# Patient Record
Sex: MALE | Race: WHITE | Employment: OTHER | URBAN - METROPOLITAN AREA
[De-identification: names, ages, dates, MRNs, and addresses within clinical notes are randomized per-mention and may not be internally consistent; named-entity substitution may affect disease eponyms.]

---

## 2022-03-24 ENCOUNTER — APPOINTMENT (OUTPATIENT)
Dept: CT IMAGING | Age: 87
DRG: 291 | End: 2022-03-24
Attending: HOSPITALIST
Payer: MEDICARE

## 2022-03-24 ENCOUNTER — APPOINTMENT (OUTPATIENT)
Dept: VASCULAR SURGERY | Age: 87
DRG: 291 | End: 2022-03-24
Attending: HOSPITALIST
Payer: MEDICARE

## 2022-03-24 ENCOUNTER — HOSPITAL ENCOUNTER (INPATIENT)
Age: 87
LOS: 5 days | Discharge: HOSPICE | DRG: 291 | End: 2022-03-29
Attending: EMERGENCY MEDICINE | Admitting: FAMILY MEDICINE
Payer: MEDICARE

## 2022-03-24 ENCOUNTER — APPOINTMENT (OUTPATIENT)
Dept: CT IMAGING | Age: 87
DRG: 291 | End: 2022-03-24
Attending: EMERGENCY MEDICINE
Payer: MEDICARE

## 2022-03-24 ENCOUNTER — APPOINTMENT (OUTPATIENT)
Dept: GENERAL RADIOLOGY | Age: 87
DRG: 291 | End: 2022-03-24
Attending: EMERGENCY MEDICINE
Payer: MEDICARE

## 2022-03-24 DIAGNOSIS — R09.02 HYPOXIA: ICD-10-CM

## 2022-03-24 DIAGNOSIS — I48.91 ATRIAL FIBRILLATION WITH RAPID VENTRICULAR RESPONSE (HCC): Primary | ICD-10-CM

## 2022-03-24 DIAGNOSIS — N28.9 ACUTE RENAL INSUFFICIENCY: ICD-10-CM

## 2022-03-24 DIAGNOSIS — R06.02 SHORTNESS OF BREATH: ICD-10-CM

## 2022-03-24 DIAGNOSIS — F01.50 VASCULAR DEMENTIA WITHOUT BEHAVIORAL DISTURBANCE (HCC): ICD-10-CM

## 2022-03-24 DIAGNOSIS — Z51.5 PALLIATIVE CARE BY SPECIALIST: ICD-10-CM

## 2022-03-24 DIAGNOSIS — R45.1 AGITATION: ICD-10-CM

## 2022-03-24 DIAGNOSIS — S01.01XA LACERATION OF SCALP, INITIAL ENCOUNTER: ICD-10-CM

## 2022-03-24 DIAGNOSIS — L03.119 CELLULITIS OF LOWER EXTREMITY, UNSPECIFIED LATERALITY: ICD-10-CM

## 2022-03-24 LAB
ALBUMIN SERPL-MCNC: 3.2 G/DL (ref 3.5–5)
ALBUMIN/GLOB SERPL: 0.9 {RATIO} (ref 1.1–2.2)
ALP SERPL-CCNC: 70 U/L (ref 45–117)
ALT SERPL-CCNC: 15 U/L (ref 12–78)
ANION GAP SERPL CALC-SCNC: 10 MMOL/L (ref 5–15)
AST SERPL-CCNC: 27 U/L (ref 15–37)
BASOPHILS # BLD: 0 K/UL (ref 0–0.1)
BASOPHILS NFR BLD: 0 % (ref 0–1)
BILIRUB SERPL-MCNC: 1.9 MG/DL (ref 0.2–1)
BNP SERPL-MCNC: ABNORMAL PG/ML (ref 0–450)
BUN SERPL-MCNC: 42 MG/DL (ref 6–20)
BUN/CREAT SERPL: 23 (ref 12–20)
CALCIUM SERPL-MCNC: 8.8 MG/DL (ref 8.5–10.1)
CHLORIDE SERPL-SCNC: 100 MMOL/L (ref 97–108)
CO2 SERPL-SCNC: 29 MMOL/L (ref 21–32)
CREAT SERPL-MCNC: 1.84 MG/DL (ref 0.7–1.3)
DIFFERENTIAL METHOD BLD: ABNORMAL
EOSINOPHIL # BLD: 0 K/UL (ref 0–0.4)
EOSINOPHIL NFR BLD: 0 % (ref 0–7)
ERYTHROCYTE [DISTWIDTH] IN BLOOD BY AUTOMATED COUNT: 15.3 % (ref 11.5–14.5)
GLOBULIN SER CALC-MCNC: 3.7 G/DL (ref 2–4)
GLUCOSE SERPL-MCNC: 110 MG/DL (ref 65–100)
HCT VFR BLD AUTO: 38 % (ref 36.6–50.3)
HGB BLD-MCNC: 12 G/DL (ref 12.1–17)
IMM GRANULOCYTES # BLD AUTO: 0.1 K/UL (ref 0–0.04)
IMM GRANULOCYTES NFR BLD AUTO: 1 % (ref 0–0.5)
LACTATE SERPL-SCNC: 2.1 MMOL/L (ref 0.4–2)
LYMPHOCYTES # BLD: 0.7 K/UL (ref 0.8–3.5)
LYMPHOCYTES NFR BLD: 5 % (ref 12–49)
MCH RBC QN AUTO: 30.2 PG (ref 26–34)
MCHC RBC AUTO-ENTMCNC: 31.6 G/DL (ref 30–36.5)
MCV RBC AUTO: 95.5 FL (ref 80–99)
MONOCYTES # BLD: 0.6 K/UL (ref 0–1)
MONOCYTES NFR BLD: 4 % (ref 5–13)
NEUTS SEG # BLD: 13 K/UL (ref 1.8–8)
NEUTS SEG NFR BLD: 90 % (ref 32–75)
NRBC # BLD: 0 K/UL (ref 0–0.01)
NRBC BLD-RTO: 0 PER 100 WBC
PLATELET # BLD AUTO: 269 K/UL (ref 150–400)
PMV BLD AUTO: 10.6 FL (ref 8.9–12.9)
POTASSIUM SERPL-SCNC: 3.9 MMOL/L (ref 3.5–5.1)
PROT SERPL-MCNC: 6.9 G/DL (ref 6.4–8.2)
RBC # BLD AUTO: 3.98 M/UL (ref 4.1–5.7)
SODIUM SERPL-SCNC: 139 MMOL/L (ref 136–145)
TROPONIN-HIGH SENSITIVITY: 25 NG/L (ref 0–76)
WBC # BLD AUTO: 14.4 K/UL (ref 4.1–11.1)

## 2022-03-24 PROCEDURE — 74011000258 HC RX REV CODE- 258: Performed by: EMERGENCY MEDICINE

## 2022-03-24 PROCEDURE — 85025 COMPLETE CBC W/AUTO DIFF WBC: CPT

## 2022-03-24 PROCEDURE — 75810000293 HC SIMP/SUPERF WND  RPR

## 2022-03-24 PROCEDURE — 74011000250 HC RX REV CODE- 250: Performed by: EMERGENCY MEDICINE

## 2022-03-24 PROCEDURE — 83605 ASSAY OF LACTIC ACID: CPT

## 2022-03-24 PROCEDURE — 99285 EMERGENCY DEPT VISIT HI MDM: CPT

## 2022-03-24 PROCEDURE — 74011000250 HC RX REV CODE- 250: Performed by: HOSPITALIST

## 2022-03-24 PROCEDURE — 70450 CT HEAD/BRAIN W/O DYE: CPT

## 2022-03-24 PROCEDURE — 74011250636 HC RX REV CODE- 250/636: Performed by: HOSPITALIST

## 2022-03-24 PROCEDURE — 65660000001 HC RM ICU INTERMED STEPDOWN

## 2022-03-24 PROCEDURE — 83880 ASSAY OF NATRIURETIC PEPTIDE: CPT

## 2022-03-24 PROCEDURE — 87040 BLOOD CULTURE FOR BACTERIA: CPT

## 2022-03-24 PROCEDURE — 77030019905 HC CATH URETH INTMIT MDII -A

## 2022-03-24 PROCEDURE — 93005 ELECTROCARDIOGRAM TRACING: CPT

## 2022-03-24 PROCEDURE — 36415 COLL VENOUS BLD VENIPUNCTURE: CPT

## 2022-03-24 PROCEDURE — 84484 ASSAY OF TROPONIN QUANT: CPT

## 2022-03-24 PROCEDURE — 71045 X-RAY EXAM CHEST 1 VIEW: CPT

## 2022-03-24 PROCEDURE — 74011250636 HC RX REV CODE- 250/636: Performed by: EMERGENCY MEDICINE

## 2022-03-24 PROCEDURE — 94640 AIRWAY INHALATION TREATMENT: CPT

## 2022-03-24 PROCEDURE — 90715 TDAP VACCINE 7 YRS/> IM: CPT | Performed by: EMERGENCY MEDICINE

## 2022-03-24 PROCEDURE — 80053 COMPREHEN METABOLIC PANEL: CPT

## 2022-03-24 PROCEDURE — 72125 CT NECK SPINE W/O DYE: CPT

## 2022-03-24 PROCEDURE — 93970 EXTREMITY STUDY: CPT

## 2022-03-24 PROCEDURE — 90471 IMMUNIZATION ADMIN: CPT

## 2022-03-24 PROCEDURE — 96374 THER/PROPH/DIAG INJ IV PUSH: CPT

## 2022-03-24 RX ORDER — SODIUM CHLORIDE 0.9 % (FLUSH) 0.9 %
5-40 SYRINGE (ML) INJECTION EVERY 8 HOURS
Status: DISCONTINUED | OUTPATIENT
Start: 2022-03-24 | End: 2022-03-29 | Stop reason: HOSPADM

## 2022-03-24 RX ORDER — IPRATROPIUM BROMIDE AND ALBUTEROL SULFATE 2.5; .5 MG/3ML; MG/3ML
3 SOLUTION RESPIRATORY (INHALATION)
Status: COMPLETED | OUTPATIENT
Start: 2022-03-24 | End: 2022-03-24

## 2022-03-24 RX ORDER — TRIAMCINOLONE ACETONIDE 1 MG/G
CREAM TOPICAL 2 TIMES DAILY
COMMUNITY

## 2022-03-24 RX ORDER — LATANOPROST 50 UG/ML
1 SOLUTION/ DROPS OPHTHALMIC
COMMUNITY

## 2022-03-24 RX ORDER — FUROSEMIDE 10 MG/ML
40 INJECTION INTRAMUSCULAR; INTRAVENOUS DAILY
Status: DISCONTINUED | OUTPATIENT
Start: 2022-03-24 | End: 2022-03-24

## 2022-03-24 RX ORDER — ASPIRIN 81 MG/1
81 TABLET ORAL DAILY
COMMUNITY

## 2022-03-24 RX ORDER — ACETAMINOPHEN 325 MG/1
650 TABLET ORAL
Status: DISCONTINUED | OUTPATIENT
Start: 2022-03-24 | End: 2022-03-29 | Stop reason: HOSPADM

## 2022-03-24 RX ORDER — ATORVASTATIN CALCIUM 20 MG/1
20 TABLET, FILM COATED ORAL DAILY
COMMUNITY

## 2022-03-24 RX ORDER — DICLOFENAC SODIUM 1 MG/ML
1 SOLUTION/ DROPS OPHTHALMIC 4 TIMES DAILY
COMMUNITY

## 2022-03-24 RX ORDER — ENOXAPARIN SODIUM 100 MG/ML
30 INJECTION SUBCUTANEOUS DAILY
Status: DISCONTINUED | OUTPATIENT
Start: 2022-03-25 | End: 2022-03-28

## 2022-03-24 RX ORDER — FUROSEMIDE 10 MG/ML
40 INJECTION INTRAMUSCULAR; INTRAVENOUS 2 TIMES DAILY
Status: DISCONTINUED | OUTPATIENT
Start: 2022-03-24 | End: 2022-03-29 | Stop reason: HOSPADM

## 2022-03-24 RX ORDER — SODIUM CHLORIDE 0.9 % (FLUSH) 0.9 %
5-40 SYRINGE (ML) INJECTION AS NEEDED
Status: DISCONTINUED | OUTPATIENT
Start: 2022-03-24 | End: 2022-03-29 | Stop reason: HOSPADM

## 2022-03-24 RX ORDER — ACETAMINOPHEN 650 MG/1
650 SUPPOSITORY RECTAL
Status: DISCONTINUED | OUTPATIENT
Start: 2022-03-24 | End: 2022-03-29 | Stop reason: HOSPADM

## 2022-03-24 RX ORDER — ONDANSETRON 4 MG/1
4 TABLET, ORALLY DISINTEGRATING ORAL
Status: DISCONTINUED | OUTPATIENT
Start: 2022-03-24 | End: 2022-03-29 | Stop reason: HOSPADM

## 2022-03-24 RX ORDER — BUMETANIDE 2 MG/1
2 TABLET ORAL 2 TIMES DAILY
COMMUNITY
End: 2022-03-29

## 2022-03-24 RX ORDER — HALOPERIDOL 5 MG/ML
1 INJECTION INTRAMUSCULAR
Status: DISCONTINUED | OUTPATIENT
Start: 2022-03-24 | End: 2022-03-28

## 2022-03-24 RX ORDER — CARVEDILOL 6.25 MG/1
6.25 TABLET ORAL 2 TIMES DAILY WITH MEALS
COMMUNITY
End: 2022-03-29

## 2022-03-24 RX ORDER — ONDANSETRON 2 MG/ML
4 INJECTION INTRAMUSCULAR; INTRAVENOUS
Status: DISCONTINUED | OUTPATIENT
Start: 2022-03-24 | End: 2022-03-29 | Stop reason: HOSPADM

## 2022-03-24 RX ORDER — POLYETHYLENE GLYCOL 3350 17 G/17G
17 POWDER, FOR SOLUTION ORAL DAILY PRN
Status: DISCONTINUED | OUTPATIENT
Start: 2022-03-24 | End: 2022-03-29 | Stop reason: HOSPADM

## 2022-03-24 RX ADMIN — WATER 1 G: 1 INJECTION INTRAMUSCULAR; INTRAVENOUS; SUBCUTANEOUS at 14:34

## 2022-03-24 RX ADMIN — SODIUM CHLORIDE 500 ML: 9 INJECTION, SOLUTION INTRAVENOUS at 08:39

## 2022-03-24 RX ADMIN — SODIUM CHLORIDE 500 ML: 9 INJECTION, SOLUTION INTRAVENOUS at 08:40

## 2022-03-24 RX ADMIN — IPRATROPIUM BROMIDE AND ALBUTEROL SULFATE 3 ML: .5; 3 SOLUTION RESPIRATORY (INHALATION) at 08:29

## 2022-03-24 RX ADMIN — Medication 5 ML: at 08:28

## 2022-03-24 RX ADMIN — SODIUM CHLORIDE, PRESERVATIVE FREE 10 ML: 5 INJECTION INTRAVENOUS at 21:29

## 2022-03-24 RX ADMIN — HALOPERIDOL LACTATE 1 MG: 5 INJECTION, SOLUTION INTRAMUSCULAR at 19:26

## 2022-03-24 RX ADMIN — DILTIAZEM HYDROCHLORIDE 2.5 MG/HR: 5 INJECTION, SOLUTION INTRAVENOUS at 08:58

## 2022-03-24 RX ADMIN — TETANUS TOXOID, REDUCED DIPHTHERIA TOXOID AND ACELLULAR PERTUSSIS VACCINE, ADSORBED 0.5 ML: 5; 2.5; 8; 8; 2.5 SUSPENSION INTRAMUSCULAR at 08:28

## 2022-03-24 RX ADMIN — CEFTRIAXONE SODIUM 1 G: 1 INJECTION, POWDER, FOR SOLUTION INTRAMUSCULAR; INTRAVENOUS at 09:53

## 2022-03-24 RX ADMIN — FUROSEMIDE 40 MG: 10 INJECTION, SOLUTION INTRAMUSCULAR; INTRAVENOUS at 19:09

## 2022-03-24 RX ADMIN — SODIUM CHLORIDE, PRESERVATIVE FREE 10 ML: 5 INJECTION INTRAVENOUS at 14:35

## 2022-03-24 NOTE — PROGRESS NOTES
1021: TRANSFER - IN REPORT:    Verbal report received from Carri Kaufman (name) on Tanya Roach  being received from [facility] (unit) for urgent transfer      Report consisted of patients Situation, Background, Assessment and   Recommendations(SBAR). Information from the following report(s) SBAR, Kardex, ED Summary, [facility] and Recent Results was reviewed with the receiving nurse. Opportunity for questions and clarification was provided. Assessment completed upon patients arrival to unit and care assumed. 1930: Bedside shift change report given to vL Perez (oncoming nurse) by Bijal Chen (offgoing nurse). Report included the following information SBAR, Kardex, ED Summary, Intake/Output, MAR and Recent Results. Student was appropriately supervised during medication administration by preceptor. Tod Olson RN     Charting and patient care of Tanya Roach by Aiyana Antonio from 1200 to 9740 was supervised and reviewed by this RN.

## 2022-03-24 NOTE — H&P
[address] [facility] Adult  Hospitalist Group  History and Physical    Date of Service:  3/24/2022  Primary Care Provider: Basil Vines MD  Source of information: The patient, Chart review and Spouse/family member    Chief Complaint: Fall      History of Presenting Illness:   Emory Buckner is a 80 y.o. male who presents with fall and tachycardia  Pt lives at [facility], pt is very hard of hearing. History mainly from his daughter. Mr. Jyothi Bethea is a 79yo male who presents to the short pumpER with complaints of a fall. Per EMS, the patient was found on the ground. It is unclear how he fell. He was noted to be hypoxic with an oxygen saturation in the 70s, per EMS. They put him on nasal cannula and his sats improved. No other complaints reported. Noticed rapid afib in ER. So cardizem drip started. Per daughter he can walk with a walker. 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 He has chronic leg swelling, but recently his leg swelling is getting worse, and also noticed increasing redness and complaining pain. No fever notced        REVIEW OF SYSTEMS:  Limited due to difficulty communicate     Past Medical History:   Diagnosis Date    Arthritis     OA (shoulders)    Cancer ([address] 2002    prostate    Fracture 2004    leg (w/ankle bracing)    Glaucoma     Hearing loss     Polymyalgia rheumatica (HCC)     Radiation proctitis     Spinal stenosis       Past Surgical History:   Procedure Laterality Date    ENDOSCOPY[facility]ON[facility] DIAG[facility]STIC  7/2005    [facility])    HX APPENDECTOMY  1995    HX HEENT  5/2005    cat, ext. (right)-(Goldmann)    HX HERNIA REPAIR  6/2004    Right     Prior to Admission medications    Not on File     Allergies   Allergen Reactions    Digoxin Unknown (comments)      No family history on file. Social History:  reports that he has never smoked.  He has never used smokeless tobacco.     Family and social history were personally reviewed, all pertinent and relevant details are outlined as above. Objective:     Visit Vitals  /69 (BP Patient Position: At rest)   Pulse 92   Temp 97.7 °F (36.5 °C)   Resp 16   Ht 5' 9\" (1.753 m)   Wt 94.5 kg (208 lb 5.4 oz)   SpO2 95%   BMI 30.77 kg/m²    O2 Flow Rate (L/min): 2 l/min O2 Device: Nasal cannula    PHYSICAL EXAM:   General: awake, following commands, but hard of hearing, right eye blind   HEENT: right eye blind   Neck: Supple, no JVD, no meningeal signs  Chest: basilar crackles, no wheezing   CVS: irregular, mild tachy S1 S2 heard, no murmurs/rubs/gallops  Abd: Soft, non-tender, non-distended, +bowel sounds   Ext: bilat swelling, red, some open skins and small wounds. Neuro/Psych:  UPPER EXT 5-/5, LOWER EXT 3/5 BILAT. Cn INTACT   Cap refill: Brisk, less than 3 seconds  Pulses: 2+, symmetric in all extremities  Skin: rash noticed back and neck, skin changes in both lower leg     Data Review: All diagnostic labs and studies have been reviewed. Abnormal Labs Reviewed   LACTIC ACID - Abnormal; Notable for the following components:       Result Value    Lactic acid 2.1 (*)     All other components within normal limits   CBC WITH AUTOMATED DIFF - Abnormal; Notable for the following components:    WBC 14.4 (*)     RBC 3.98 (*)     HGB 12.0 (*)     RDW 15.3 (*)     NEUTROPHILS 90 (*)     LYMPHOCYTES 5 (*)     MONOCYTES 4 (*)     IMMATURE GRANULOCYTES 1 (*)     ABS. NEUTROPHILS 13.0 (*)     ABS. LYMPHOCYTES 0.7 (*)     ABS. IMM.  GRANS. 0.1 (*)     All other components within normal limits   METABOLIC PANEL, COMPREHENSIVE - Abnormal; Notable for the following components:    Glucose 110 (*)     BUN 42 (*)     Creatinine 1.84 (*)     BUN/Creatinine ratio 23 (*)     GFR est AA 41 (*)     GFR est non-AA 34 (*)     Bilirubin, total 1.9 (*)     Albumin 3.2 (*)     A-G Ratio 0.9 (*)     All other components within normal limits   NT-PRO BNP - Abnormal; Notable for the following components:    NT pro-BNP 17,855 (*)     All other components within normal limits       All Micro Results     Procedure Component Value Units Date/Time    CULTURE, URINE [[id number]]     Order Status: Sent Specimen: Urine from Clean catch     CULTURE, BLOOD, PAIRED [[id number]] Collected: 03/24/22 0836    Order Status: Completed Specimen: Blood Updated: 03/24/22 0859          IMAGING:   XR CHEST PORT   Final Result   Small right pleural effusion with underlying atelectasis. CT HEAD WO CONT   Final Result   Old left occipital infarct. Generalized atrophy. No acute abnormality. ECG/ECHO:    Results for orders placed or performed during the hospital encounter of 03/24/22   EKG, 12 LEAD, INITIAL   Result Value Ref Range    Ventricular Rate 124 BPM    Atrial Rate 125 BPM    QRS Duration 82 ms    Q-T Interval 348 ms    QTC Calculation (Bezet) 499 ms    Calculated R Axis 36 degrees    Calculated T Axis -30 degrees    Diagnosis       Atrial fibrillation with rapid ventricular response  ST & T wave abnormality, consider lateral ischemia  Abnormal ECG  No previous ECGs available          Assessment:   Given the patient's current clinical presentation, there is a high level of concern for decompensation if discharged from the emergency department. Complex decision making was performed, which includes reviewing the patient's available past medical records, laboratory results, and imaging studies. Active Problems:    Hypoxemia (3/24/2022)      A-fib ([address] (3/24/2022)      Plan:     1. Rapid afib: not a AC candidate due to fall and history of GIB. Continue asa, cardizem drip, echo. Cardiologist. Consult. 2. Hypoxia: may due to chf, acute on chronic diastolic chf. Was taking bumex 2mg bid. Will continue lasix 40mg iv bid, will assess output, and cr. Echo pending. 3. Bilateral leg cellulitis: iv ancef (dose adjusted), wound care   4. 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  Fall: PT/OT         DIET: No diet orders on file ISOLATION PRECAUTIONS: There are currently no Active Isolations  CODE STATUS: No Order   DVT PROPHYLAXIS: Lovenox  FUNCTIONAL STATUS PRIOR TO HOSPITALIZATION: Capable of only limited self-care; confined to bed or chair likely more than 50% of waking hours. EARLY MOBILITY ASSESSMENT: Recommend an assessment from physical therapy and/or occupational therapy  ANTICIPATED DISCHARGE: Greater than 48 hours. EMERGENCY CONTACT/SURROGATE DECISION MAKER: daughter, called and updated     CRITICAL CARE WAS PERFORMED FOR THIS ENCOUNTER: NO.      Signed By: Grady Anand MD     March 24, 2022         Please note that this dictation may have been completed with Dragon, the Telik voice recognition software. Quite often unanticipated grammatical, syntax, homophones, and other interpretive errors are inadvertently transcribed by the computer software. Please disregard these errors. Please excuse any errors that have escaped final proofreading.

## 2022-03-24 NOTE — PROGRESS NOTES
10:21: TRANSFER - IN REPORT:    Verbal report received from Sugey NAVARRO(name) on Beronica Wellss  being received from [facility](unit) for routine progression of care      Report consisted of patients Situation, Background, Assessment and   Recommendations(SBAR). Information from the following report(s) SBAR, Kardex, ED Summary, MAR, Recent Results and Cardiac Rhythm A fib was reviewed with the receiving nurse. Opportunity for questions and clarification was provided. Assessment completed upon patients arrival to unit and care assumed. 1730: traveled down to CT with pt. Off the floor. 1800: Pt is back on the floor.

## 2022-03-24 NOTE — ED NOTES
TRANSFER - OUT REPORT:    Verbal report given to lakisha SCOTT (name) on [place]  being transferred to [facility] (unit) for routine progression of care       Report consisted of patients Situation, Background, Assessment and   Recommendations(SBAR). Information from the following report(s) SBAR and ED Summary was reviewed with the receiving nurse. Lines:   Peripheral IV 03/24/22 Right Hand (Active)       Peripheral IV 03/24/22 Left Antecubital (Active)   Site Assessment Clean, dry, & intact 03/24/22 0847   Phlebitis Assessment 0 03/24/22 0847   Dressing Status Clean, dry, & intact 03/24/22 0847   Hub Color/Line Status Flushed 03/24/22 0847   Action Taken Blood drawn 03/24/22 0847        Opportunity for questions and clarification was provided.       Patient transported with:   Monitor

## 2022-03-24 NOTE — PROGRESS NOTES
Occupational Therapy:    Chart reviewed and attempted to see for OT session, however per daughter, patient just began sleeping after agitation and confusion prior to arrival, asking to hold therapy evaluation until tomorrow. Will follow up then.     Darren Jacobs, OT Admission

## 2022-03-24 NOTE — PROGRESS NOTES
Problem: Pressure Injury - Risk of  Goal: *Prevention of pressure injury  Description: Document Phillip Scale and appropriate interventions in the flowsheet. Outcome: Progressing Towards Goal  Note: Pressure Injury Interventions:  Sensory Interventions: Turn and reposition approx. every two hours (pillows and wedges if needed)    Moisture Interventions: Absorbent underpads    Activity Interventions: Chair cushion,PT/OT evaluation    Mobility Interventions: Float heels,Chair cushion,Turn and reposition approx. every two hours(pillow and wedges)    Nutrition Interventions: Document food/fluid/supplement intake    Friction and Shear Interventions: Transferring/repositioning devices                Problem: Patient Education: Go to Patient Education Activity  Goal: Patient/Family Education  Outcome: Progressing Towards Goal     Problem: Pain  Goal: *Control of Pain  Outcome: Progressing Towards Goal  Goal: *PALLIATIVE CARE:  Alleviation of Pain  Outcome: Progressing Towards Goal     Problem: Patient Education: Go to Patient Education Activity  Goal: Patient/Family Education  Outcome: Progressing Towards Goal     Problem: Pressure Injury - Risk of  Goal: *Prevention of pressure injury  Description: Document Phillip Scale and appropriate interventions in the flowsheet. Outcome: Progressing Towards Goal  Note: Pressure Injury Interventions:  Sensory Interventions: Turn and reposition approx. every two hours (pillows and wedges if needed)    Moisture Interventions: Absorbent underpads    Activity Interventions: Chair cushion,PT/OT evaluation    Mobility Interventions: Float heels,Chair cushion,Turn and reposition approx.  every two hours(pillow and wedges)    Nutrition Interventions: Document food/fluid/supplement intake    Friction and Shear Interventions: Transferring/repositioning devices                Problem: Patient Education: Go to Patient Education Activity  Goal: Patient/Family Education  Outcome: Progressing Towards Goal     Problem: Falls - Risk of  Goal: *Absence of Falls  Description: Document Danita Hesham Fall Risk and appropriate interventions in the flowsheet.   Outcome: Progressing Towards Goal  Note: Fall Risk Interventions:  Mobility Interventions: Patient to call before getting OOB         Medication Interventions: Patient to call before getting OOB    Elimination Interventions: Patient to call for help with toileting needs    History of Falls Interventions: Bed/chair exit alarm         Problem: Patient Education: Go to Patient Education Activity  Goal: Patient/Family Education  Outcome: Progressing Towards Goal

## 2022-03-24 NOTE — ED TRIAGE NOTES
Nursing home staff found patient on bathroom floor tonight. Small laceration to right side of head and skin tear to right upper arm.  O2 sats noted to be in the 70's upon EMS arrival.

## 2022-03-24 NOTE — ACP (ADVANCE CARE PLANNING)
Advance Care Planning Note    Name: Odette Sanches  YOB: 1923  MRN: [id number]  Admission Date: 3/24/2022  7:09 AM    Date of discussion: 3/24/2022    Active Diagnoses:    Hospital Problems  Date Reviewed: 12/7/2012          Codes Class Noted POA    Hypoxemia ICD-10-CM: R09.02  ICD-9-CM: 799.02  3/24/2022 Unknown        A-fib [facility]) ICD-10-CM: I48.91  ICD-9-CM: 427.31  3/24/2022 Unknown               These active diagnoses are of sufficient risk that focused discussion on advance care planning is indicated in order to allow the patient to thoughtfully consider personal goals of care, and if situations arise that prevent the ability to personally give input, to ensure appropriate representation of their personal desires for different levels and aggressiveness of care. Discussion:     Persons present and participating in discussion: Eb Ruvalcaba MD    Discussion: CPR/ACLS/Cardioversion/Intubation/BiPAP/Renal failure requiring dialysis  DNR  Time Spent:     Total time spent face-to-face in education and discussion: 16 minutes.      Sandi Hernandez MD  3/24/2022  1:04 PM    Patient's emergency contacts:  Extended Emergency Contact Information  Primary Emergency Contact: Navdeep Decker  Address: [address] [place] [address] Phone: [phone]  Mobile Phone: [phone]  Relation: Daughter

## 2022-03-24 NOTE — PROGRESS NOTES
Physical Therapy  3/24/2022    Order received, chart reviewed, and evaluation attempted x2. Upon first attempt, patient wound care RN preparing for assessment and treatment. Patient oriented to person only, yelling, and difficult to redirect. Upon second attempt, daughter reported that patient just began to sleep and preferred that PT/OT allow him to rest. F/u tomorrow for eval. Note he lives at [address] and ambulates with rollator. Admitted after a fall and found to be in afib with RVR. Thank you.     Katharine Fernandez, PT, DPT

## 2022-03-24 NOTE — WOUND CARE
WOCN Note:     New consult placed for assessment of right upper arm, right buttock, right low leg, right toe, right plantar heel and left toe. Chart reviewed. Assessed in room 470 with Jane. Admitted DX:  A-fib; Hypoxemia  Past Medical History:   Diagnosis Date    Arthritis     OA (shoulders)    Cancer ([address] 2002    prostate    Fracture 2004    leg (w/ankle bracing)    Glaucoma     Hearing loss     Polymyalgia rheumatica (HCC)     Radiation proctitis     Spinal stenosis    CVA; Admitted from sunrise     Assessment:   Patient has dementia, [place] and blind in right eye and requires assist of 2 with repositioning. Bed: [place]  Patient wearing briefs for incontinence. Patient reports no pain. Patient repositioned on left side with pillow. Heels offloaded with pillows. Large ecchymotic area to sacrum. Staples to right side of head from fall prior to admission. Bilateral low leg redness from suspected cellulitis with edema. 1. POA Right low leg, venous wound: 8 x 4 x 0.1 cm  100% red. Small serous exudate; no odor. Periwound with red erythema. 2.  POA Right buttock, partial thickness wound within a ridged area from moisture/friction:  0.5 x 0.5 x 0.1 cm; 100% red; no exudate. 3.  POA Right plantar heel, traumatic wound from wheelchair per daughter; 100% dry black; 0.5 x 2 x 0 cm. 4.  POA Right 2nd toe, partial thickness wound from edema:  1 x 2 x 0.1 cm; 100% yellow; no exudate or odor. 5.  POA Left 3rd toe, partial thickness wound at the nail bed appears to be traumatic in origin:  100% red; no exudate or odor. Wound, Pressure Prevention & Skin Care Recommendations:    1. Minimize layers of linen/pads under patient to optimize support surface. 2.  Turn/reposition approximately every 2 hours and offload heels. 3.  Manage moisture/ Keep skin folds clean and dry/minimize brief usage. 4. Right upper arm:  Every 3 days apply Xeroform and foam dressing.   5.  Buttocks:  Zinc cream every 8 hours and with incontinence care. 6.  Right low leg, right plantar heel, right toe and left toe:  Every other day Xeroform and dry dressing. Discussed above plan with daughter and Keysha Vinson.     Transition of Care:   Plan to follow as needed while admitted to hospital.    Sidney Gutierres, BSN RN [facility] [place] [facility]  Available on Perfect Serve  Office [phone]

## 2022-03-24 NOTE — ED PROVIDER NOTES
Mr. Daryle Robes is a 81yo male who presents to the ER with complaints of a fall. Per EMS, the patient was found on the ground. It is unclear how he fell. He was noted to be hypoxic with an oxygen saturation in the 70s, per EMS. They put him on nasal cannula and his sats improved. No other complaints reported      The patient's history is limited by his baseline mental status. Past Medical History:   Diagnosis Date    Arthritis     OA (shoulders)    Cancer ([address] 2002    prostate    Fracture 2004    leg (w/ankle bracing)    Glaucoma     Hearing loss     Polymyalgia rheumatica (HCC)     Radiation proctitis     Spinal stenosis        Past Surgical History:   Procedure Laterality Date    ENDOSCOPY, COLON, DIAGNOSTIC  7/2005    ([facility])    HX APPENDECTOMY  1995    HX HEENT  5/2005    cat, ext. (right)-(Goldmann)    HX HERNIA REPAIR  6/2004    Right         No family history on file. Social History     Socioeconomic History    Marital status:      Spouse name: Not on file    Number of children: Not on file    Years of education: Not on file    Highest education level: Not on file   Occupational History    Not on file   Tobacco Use    Smoking status: Never Smoker    Smokeless tobacco: Never Used   Substance and Sexual Activity    Alcohol use: Not on file    Drug use: Not on file    Sexual activity: Not on file   Other Topics Concern    Not on file   Social History Narrative    Not on file     Social Determinants of Health     Financial Resource Strain:     Difficulty of Paying Living Expenses: Not on file   Food Insecurity:     Worried About Running Out of Food in the Last Year: Not on file    Derick of Food in the Last Year: Not on file   Transportation Needs:     Lack of Transportation (Medical): Not on file    Lack of Transportation (Non-Medical):  Not on file   Physical Activity:     Days of Exercise per Week: Not on file    Minutes of Exercise per Session: Not on file   Stress:     Feeling of Stress : Not on file   Social Connections:     Frequency of Communication with Friends and Family: Not on file    Frequency of Social Gatherings with Friends and Family: Not on file    Attends Anglican Services: Not on file    Active Member of Clubs or Organizations: Not on file    Attends Club or Organization Meetings: Not on file    Marital Status: Not on file   Intimate Partner Violence:     Fear of Current or Ex-Partner: Not on file    Emotionally Abused: Not on file    Physically Abused: Not on file    Sexually Abused: Not on file   Housing Stability:     Unable to Pay for Housing in the Last Year: Not on file    Number of [place] in the Last Year: Not on file    Unstable Housing in the Last Year: Not on file         ALLERGIES: Digoxin    Review of Systems   Unable to perform ROS: Dementia       Vitals:    03/24/22 0716 03/24/22 0721   BP: (!) 96/59 101/70   Pulse:  (!) 124   Resp: 24 25   Temp: 97.7 °F (36.5 °C)    SpO2: 93% 98%   Weight: 94.5 kg (208 lb 5.4 oz)    Height: 5' 9\" (1.753 m)             Physical Exam     Vital signs reviewed. Nursing notes reviewed.     Const:  No acute distress, well developed, well nourished  Head: 3 cm laceration to the right occiput  Eyes:  PERRL, conjunctiva normal, no scleral icterus  Neck:  Supple, trachea midline  Cardiovascular: Tachycardic  Resp: Mild resp distress, + increased work of breathing, mild wheezes, no rhonchi, no rales,  Abd:  Soft, non-tender, non-distended, no rebound, no guarding  MSK: Significant bilateral pedal edema with erythema circumferentially on the lower legs and multiple sores on bilateral lower extremities  Neuro:  Alert and awake, no cranial nerve defect  Skin: Large skin tear to the right forearm, small amount of bruising at the umbilicus  Psych: Not agitated          MDM       Wound Repair    Date/Time: 3/24/2022 9:34 AM  Performed by: attendingPreparation: skin prepped with Olga  Pre-procedure re-eval: Immediately prior to the procedure, the patient was reevaluated and found suitable for the planned procedure and any planned medications. Time out: Immediately prior to the procedure a time out was called to verify the correct patient, procedure, equipment, staff and marking as appropriate. .  Location details: scalp  Wound length:2.6 - 7.5 cm    Anesthesia:  Local Anesthetic: LET (lido, epi, tetracaine)  Foreign bodies: no foreign bodies  Debridement: none  Skin closure: staples  Number of sutures: 5 staples. Technique: simple  Approximation: close  Dressing: 4x4  Patient tolerance: patient tolerated the procedure well with no immediate complications  My total time at bedside, performing this procedure was 1-15 minutes. Perfect Serve Consult for Admission  9:32 AM    ED Room Number: SER08/08  Patient Name and age:  Tanya Roach 80 y.o.  male  Working Diagnosis:   1. Atrial fibrillation with rapid ventricular response ([address]    2. Acute renal insufficiency    3. Hypoxia    4. Cellulitis of lower extremity, unspecified laterality        COVID-19 Suspicion:  no  Sepsis present:  yes  Reassessment needed: yes  Code Status:  Do Not Resuscitate  Readmission: no  Isolation Requirements:  no  Recommended Level of Care:  telemetry  Department:[facility] - [phone]      Total critical care time spent exclusive of procedures:  35 min. Mr. Rena Pryor is a 81yo male who presents to the ER after a fall. Pt. Found to be in afib with RVR with renal insufficiency. Pt. Also appears to have cellulitis. He was hypoxic on room air but his sats improve with supplemental oxygen. Pt. To be admitted by the hospitalist for further care.

## 2022-03-25 LAB
ALBUMIN SERPL-MCNC: 2.6 G/DL (ref 3.5–5)
ALBUMIN/GLOB SERPL: 0.9 {RATIO} (ref 1.1–2.2)
ALP SERPL-CCNC: 64 U/L (ref 45–117)
ALT SERPL-CCNC: 13 U/L (ref 12–78)
ANION GAP SERPL CALC-SCNC: 7 MMOL/L (ref 5–15)
AST SERPL-CCNC: 21 U/L (ref 15–37)
ATRIAL RATE: 125 BPM
BACTERIA SPEC CULT: NORMAL
BASOPHILS # BLD: 0 K/UL (ref 0–0.1)
BASOPHILS NFR BLD: 0 % (ref 0–1)
BILIRUB SERPL-MCNC: 0.9 MG/DL (ref 0.2–1)
BUN SERPL-MCNC: 39 MG/DL (ref 6–20)
BUN/CREAT SERPL: 23 (ref 12–20)
CALCIUM SERPL-MCNC: 8.2 MG/DL (ref 8.5–10.1)
CALCULATED R AXIS, ECG10: 36 DEGREES
CALCULATED T AXIS, ECG11: -30 DEGREES
CHLORIDE SERPL-SCNC: 104 MMOL/L (ref 97–108)
CO2 SERPL-SCNC: 27 MMOL/L (ref 21–32)
CREAT SERPL-MCNC: 1.71 MG/DL (ref 0.7–1.3)
DIAGNOSIS, 93000: NORMAL
DIFFERENTIAL METHOD BLD: ABNORMAL
EOSINOPHIL # BLD: 0.1 K/UL (ref 0–0.4)
EOSINOPHIL NFR BLD: 1 % (ref 0–7)
ERYTHROCYTE [DISTWIDTH] IN BLOOD BY AUTOMATED COUNT: 15.4 % (ref 11.5–14.5)
GLOBULIN SER CALC-MCNC: 2.9 G/DL (ref 2–4)
GLUCOSE SERPL-MCNC: 99 MG/DL (ref 65–100)
HCT VFR BLD AUTO: 35.6 % (ref 36.6–50.3)
HGB BLD-MCNC: 11.3 G/DL (ref 12.1–17)
IMM GRANULOCYTES # BLD AUTO: 0.1 K/UL (ref 0–0.04)
IMM GRANULOCYTES NFR BLD AUTO: 1 % (ref 0–0.5)
LYMPHOCYTES # BLD: 0.9 K/UL (ref 0.8–3.5)
LYMPHOCYTES NFR BLD: 9 % (ref 12–49)
MAGNESIUM SERPL-MCNC: 2.1 MG/DL (ref 1.6–2.4)
MCH RBC QN AUTO: 30.4 PG (ref 26–34)
MCHC RBC AUTO-ENTMCNC: 31.7 G/DL (ref 30–36.5)
MCV RBC AUTO: 95.7 FL (ref 80–99)
MONOCYTES # BLD: 0.6 K/UL (ref 0–1)
MONOCYTES NFR BLD: 6 % (ref 5–13)
NEUTS SEG # BLD: 8.9 K/UL (ref 1.8–8)
NEUTS SEG NFR BLD: 83 % (ref 32–75)
NRBC # BLD: 0 K/UL (ref 0–0.01)
NRBC BLD-RTO: 0 PER 100 WBC
PHOSPHATE SERPL-MCNC: 3.6 MG/DL (ref 2.6–4.7)
PLATELET # BLD AUTO: 231 K/UL (ref 150–400)
PMV BLD AUTO: 10.2 FL (ref 8.9–12.9)
POTASSIUM SERPL-SCNC: 3.4 MMOL/L (ref 3.5–5.1)
PROT SERPL-MCNC: 5.5 G/DL (ref 6.4–8.2)
Q-T INTERVAL, ECG07: 348 MS
QRS DURATION, ECG06: 82 MS
QTC CALCULATION (BEZET), ECG08: 499 MS
RBC # BLD AUTO: 3.72 M/UL (ref 4.1–5.7)
SERVICE CMNT-IMP: NORMAL
SODIUM SERPL-SCNC: 138 MMOL/L (ref 136–145)
VENTRICULAR RATE, ECG03: 124 BPM
WBC # BLD AUTO: 10.5 K/UL (ref 4.1–11.1)

## 2022-03-25 PROCEDURE — P9047 ALBUMIN (HUMAN), 25%, 50ML: HCPCS | Performed by: INTERNAL MEDICINE

## 2022-03-25 PROCEDURE — 74011250637 HC RX REV CODE- 250/637: Performed by: INTERNAL MEDICINE

## 2022-03-25 PROCEDURE — 74011250636 HC RX REV CODE- 250/636: Performed by: HOSPITALIST

## 2022-03-25 PROCEDURE — 74011250636 HC RX REV CODE- 250/636: Performed by: INTERNAL MEDICINE

## 2022-03-25 PROCEDURE — 74011000250 HC RX REV CODE- 250: Performed by: HOSPITALIST

## 2022-03-25 PROCEDURE — 85025 COMPLETE CBC W/AUTO DIFF WBC: CPT

## 2022-03-25 PROCEDURE — 94640 AIRWAY INHALATION TREATMENT: CPT

## 2022-03-25 PROCEDURE — 97535 SELF CARE MNGMENT TRAINING: CPT

## 2022-03-25 PROCEDURE — 74011250637 HC RX REV CODE- 250/637: Performed by: HOSPITALIST

## 2022-03-25 PROCEDURE — 65660000001 HC RM ICU INTERMED STEPDOWN

## 2022-03-25 PROCEDURE — 97165 OT EVAL LOW COMPLEX 30 MIN: CPT

## 2022-03-25 PROCEDURE — 80053 COMPREHEN METABOLIC PANEL: CPT

## 2022-03-25 PROCEDURE — 83735 ASSAY OF MAGNESIUM: CPT

## 2022-03-25 PROCEDURE — 87086 URINE CULTURE/COLONY COUNT: CPT

## 2022-03-25 PROCEDURE — 36415 COLL VENOUS BLD VENIPUNCTURE: CPT

## 2022-03-25 PROCEDURE — 84100 ASSAY OF PHOSPHORUS: CPT

## 2022-03-25 PROCEDURE — 97161 PT EVAL LOW COMPLEX 20 MIN: CPT

## 2022-03-25 RX ORDER — POTASSIUM CHLORIDE 750 MG/1
40 TABLET, FILM COATED, EXTENDED RELEASE ORAL 2 TIMES DAILY
Status: COMPLETED | OUTPATIENT
Start: 2022-03-25 | End: 2022-03-26

## 2022-03-25 RX ORDER — DILTIAZEM HYDROCHLORIDE 30 MG/1
15 TABLET, FILM COATED ORAL
Status: DISCONTINUED | OUTPATIENT
Start: 2022-03-25 | End: 2022-03-29 | Stop reason: HOSPADM

## 2022-03-25 RX ORDER — METOPROLOL TARTRATE 25 MG/1
12.5 TABLET, FILM COATED ORAL EVERY 12 HOURS
Status: DISCONTINUED | OUTPATIENT
Start: 2022-03-25 | End: 2022-03-29 | Stop reason: HOSPADM

## 2022-03-25 RX ORDER — MIDODRINE HYDROCHLORIDE 5 MG/1
10 TABLET ORAL
Status: DISCONTINUED | OUTPATIENT
Start: 2022-03-25 | End: 2022-03-29 | Stop reason: HOSPADM

## 2022-03-25 RX ORDER — ALBUMIN HUMAN 250 G/1000ML
25 SOLUTION INTRAVENOUS 2 TIMES DAILY
Status: DISCONTINUED | OUTPATIENT
Start: 2022-03-25 | End: 2022-03-28

## 2022-03-25 RX ORDER — IPRATROPIUM BROMIDE AND ALBUTEROL SULFATE 2.5; .5 MG/3ML; MG/3ML
3 SOLUTION RESPIRATORY (INHALATION)
Status: DISCONTINUED | OUTPATIENT
Start: 2022-03-25 | End: 2022-03-27

## 2022-03-25 RX ORDER — METOPROLOL TARTRATE 25 MG/1
25 TABLET, FILM COATED ORAL EVERY 12 HOURS
Status: DISCONTINUED | OUTPATIENT
Start: 2022-03-25 | End: 2022-03-25

## 2022-03-25 RX ADMIN — ENOXAPARIN SODIUM 30 MG: 100 INJECTION SUBCUTANEOUS at 09:22

## 2022-03-25 RX ADMIN — ALBUMIN (HUMAN) 25 G: 0.25 INJECTION, SOLUTION INTRAVENOUS at 09:23

## 2022-03-25 RX ADMIN — POTASSIUM CHLORIDE 40 MEQ: 750 TABLET, EXTENDED RELEASE ORAL at 09:22

## 2022-03-25 RX ADMIN — DILTIAZEM HYDROCHLORIDE 15 MG: 30 TABLET, FILM COATED ORAL at 21:13

## 2022-03-25 RX ADMIN — METOPROLOL TARTRATE 12.5 MG: 25 TABLET, FILM COATED ORAL at 21:12

## 2022-03-25 RX ADMIN — WATER 1 G: 1 INJECTION INTRAMUSCULAR; INTRAVENOUS; SUBCUTANEOUS at 01:54

## 2022-03-25 RX ADMIN — FUROSEMIDE 40 MG: 10 INJECTION, SOLUTION INTRAMUSCULAR; INTRAVENOUS at 17:38

## 2022-03-25 RX ADMIN — POTASSIUM CHLORIDE 40 MEQ: 750 TABLET, EXTENDED RELEASE ORAL at 17:38

## 2022-03-25 RX ADMIN — SODIUM CHLORIDE, PRESERVATIVE FREE 10 ML: 5 INJECTION INTRAVENOUS at 21:15

## 2022-03-25 RX ADMIN — FUROSEMIDE 40 MG: 10 INJECTION, SOLUTION INTRAMUSCULAR; INTRAVENOUS at 09:22

## 2022-03-25 RX ADMIN — WATER 1 G: 1 INJECTION INTRAMUSCULAR; INTRAVENOUS; SUBCUTANEOUS at 13:10

## 2022-03-25 RX ADMIN — MIDODRINE HYDROCHLORIDE 10 MG: 5 TABLET ORAL at 21:12

## 2022-03-25 RX ADMIN — ALBUMIN (HUMAN) 25 G: 0.25 INJECTION, SOLUTION INTRAVENOUS at 17:37

## 2022-03-25 RX ADMIN — IPRATROPIUM BROMIDE AND ALBUTEROL SULFATE 3 ML: .5; 3 SOLUTION RESPIRATORY (INHALATION) at 19:01

## 2022-03-25 RX ADMIN — SODIUM CHLORIDE, PRESERVATIVE FREE 10 ML: 5 INJECTION INTRAVENOUS at 05:43

## 2022-03-25 NOTE — PROGRESS NOTES
1930: Bedside shift change report given to Annie Leiva RN (oncoming nurse) by Bijal Chen RN (offgoing nurse). Report included the following information SBAR, Kardex, Intake/Output, MAR, and Recent Results. 2200: pt BP 86/48. Provider notified. No orders received at this time. 0330: straight cath performed to obtain urine specimen r/t pt being incontinent. 590mL out. 0730: Bedside shift change report given to Josue Siddiqui RN (oncoming nurse) by Annie Leiva RN (offgoing nurse). Report included the following information SBAR, Kardex, Intake/Output, MAR and Recent Results. Problem: Pressure Injury - Risk of  Goal: *Prevention of pressure injury  Description: Document Phillip Scale and appropriate interventions in the flowsheet.   Outcome: Progressing Towards Goal  Note: Pressure Injury Interventions:  Sensory Interventions: Minimize linen layers    Moisture Interventions: Absorbent underpads    Activity Interventions: Pressure redistribution bed/mattress(bed type),PT/OT evaluation    Mobility Interventions: Pressure redistribution bed/mattress (bed type)    Nutrition Interventions: Document food/fluid/supplement intake    Friction and Shear Interventions: Minimize layers

## 2022-03-25 NOTE — PROGRESS NOTES
Hospital follow-up new PCP transitional care appointment has been scheduled with Dr. Georgina Law for Wednesday, 4/6/22 at 2:00 p.m. Pending patient discharge. Alison Marques, Care Management Assistant.

## 2022-03-25 NOTE — CONSULTS
[address]    Name:  Keiko Aguilera  MR#:  [id number]  :  1923  ACCOUNT #:  [de-identified]  DATE OF SERVICE:  2022    REFERRING PHYSICIAN:  Volney Dubin, MD.    HISTORY OF PRESENT ILLNESS:  This is a 41-year-old gentleman admitted to the hospital after a fall at his assisted living facility that was unwitnessed. He apparently did not sustain significant trauma. We are called to see him because of his heart failure symptoms and his atrial fibrillation with rapid ventricular response. The patient is not on anticoagulation for his chronic atrial fibrillation due to GI bleeds. He is sleeping during the interview, but prior to this, had been agitated, but appeared to be in no distress. REVIEW OF SYSTEMS:  Not available. PHYSICAL EXAMINATION:  GENERAL:  This is a well-developed, elderly gentleman lying flat in his bed, sleeping during the evaluation and physical exam.  VITAL SIGNS:  Heart rate is currently 88, respirations 16, blood pressure is 90/56, saturation 96% on room air. HEENT:  Unremarkable. NECK:  Supple. CHEST WALL:  Unremarkable. LUNGS:  Anteriorly decreased breath sounds, but clear. HEART:  Irregular rhythm, moderate rate. No S3 gallop or friction rub. EXTREMITIES:  4+ bilateral lower extremity edema. The skin on his legs and feet appears to be intact. NEUROLOGIC:  Deferred as the patient was sleeping. Briefly discussed the case with his nurse. LABORATORY DATA:  His labs include a hemoglobin of 12.0, hematocrit of 36, platelet count of 838,331. Chemistries; BUN 42, creatinine 1.84. His NT-proBNP was 17,855 and has a high-sensitivity troponin, was 25. Lactic was slightly elevated, 2.1. IMPRESSION:  The patient has chronic systolic heart failure, chronic atrial fibrillation, fall that prompted this admission. His heart failure was moderately decompensated. RECOMMENDATIONS:  IV loop diuretics was ordered. We will add IV albumin . He is a poor candidate for anticoagulation as documented in Dr. Willian Maria last office visit dated 03/27/2019, and we will add carvedilol when he is able to take pills by mouth and discontinue diltiazem. Further recommendations to follow.     Thank you for this referral.        Sylvia Kendall MD [id number]  D:  03/25/2022 1:34  T:  03/25/2022 4:50  JOB #:  [id number]

## 2022-03-25 NOTE — PROGRESS NOTES
[facility]  Hospitalist Group                                                                                          Hospitalist Progress Note  Edison Reyes MD  Answering service: [phone] from in house phone        Date of Service:  3/25/2022  NAME:  Viri Diez  :  1923  MRN:  [id number]      Admission Summary:   Viri Diez is a 80 y.o. male who presents with fall and tachycardia  [facility] lives [facility], pt is very hard of hearing. History mainly from his daughter. Mr. Josue Houston is a 79yo male who presents to the short pump with complaints of a fall.  Per EMS, the patient was found on the ground. Katherin Sleet is unclear how he fell. Shan Quiñonez was noted to be hypoxic with an oxygen saturation in the 70s, per EMS. Eb Black put him on nasal cannula and his sats improved.  No other complaints reported. Noticed rapid afib in ER. So cardizem drip started. Interval history / Subjective:   Patient seen and examined discussed with RN found sleeping  Status post fall and sent from nursing home PT OT Case management consult     Assessment & Plan:     1. Rapid afib:   - not a AC candidate due to fall and history of GIB. Continue asa, cardizem drip, echo. Cardiologist. Consult. 2.  CHF reduced ejection fraction chronic systolic CHF  --Continue diuresis beta-blocker when able check echo  --Continue support blood pressure with IV albumin    3. Bilateral leg cellulitis: iv ancef (dose adjusted), wound care     4. 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 Fall: PT/OT     6.   Hypokalemia replete oral keep potassium 4        Code status: DNR  DVT prophylaxis: Lovenox    Care Plan discussed with: Patient/Family and Nurse  Anticipated Disposition: SNF/LTC  Anticipated Discharge: 24 hours to 48 hours     Hospital Problems  Date Reviewed: 3/24/2022          Codes Class Noted POA    Hypoxemia ICD-10-CM: R09.02  ICD-9-CM: 799.02  3/24/2022 Unknown        A-fib ([facility] [address] ICD-10-CM: I48.91  ICD-9-CM: 427.31  3/24/2022 Unknown                Review of Systems:   Review of systems not obtained due to patient factors. Vital Signs:    Last 24hrs VS reviewed since prior progress note. Most recent are:  Visit Vitals  /80 (BP Patient Position: At rest)   Pulse (!) 140   Temp 98.2 °F (36.8 °C)   Resp 18   Ht 5' 9\" (1.753 m)   Wt 97.1 kg (214 lb)   SpO2 94%   BMI 31.60 kg/m²         Intake/Output Summary (Last 24 hours) at 3/25/2022 1502  Last data filed at 3/25/2022 0330  Gross per 24 hour   Intake 72.46 ml   Output 590 ml   Net -517.54 ml        Physical Examination:     I had a face to face encounter with this patient and independently examined them on 3/25/2022 as outlined below:          General : no acute distress, resting in bed,  HEENT: PEERL, EOMI, moist mucus membrane, TM clear  Neck: supple, no JVD, no meningeal signs  Chest: Clear to auscultation bilaterally   CVS: S1 S2 heard, Capillary refill less than 2 seconds  Abd: soft/ Non tender, non distended, BS physiological,   Ext: no clubbing, no cyanosis, no edema, brisk 2+ DP pulses  Neuro/Psych: pleasant mood and affect, CN 2-12 grossly intact  Skin: warm     Data Review:    I personally reviewed  Image and labs      Labs:     Recent Labs     03/25/22 0212 03/24/22  0836   WBC 10.5 14.4*   HGB 11.3* 12.0*   HCT 35.6* 38.0    269     Recent Labs     03/25/22 0212 03/24/22  0836    139   K 3.4* 3.9    100   CO2 27 29   BUN 39* 42*   CREA 1.71* 1.84*   GLU 99 110*   CA 8.2* 8.8   MG 2.1  --    PHOS 3.6  --      Recent Labs     03/25/22 0212 03/24/22  0836   ALT 13 15   AP 64 70   TBILI 0.9 1.9*   TP 5.5* 6.9   ALB 2.6* 3.2*   GLOB 2.9 3.7     No results for input(s): INR, PTP, APTT, INREXT in the last 72 hours. No results for input(s): FE, TIBC, PSAT, FERR in the last 72 hours. No results found for: FOL, RBCF   No results for input(s): PH, PCO2, PO2 in the last 72 hours.   No results for input(s): CPK, CKNDX, TROIQ in the last 72 hours.     No lab exists for component: CPKMB  No results found for: CHOL, CHOLX, CHLST, CHOLV, HDL, HDLP, LDL, LDLC, DLDLP, TGLX, TRIGL, TRIGP, CHHD, CHHDX  No results found for: [facility]  Lab Results   Component Value Date/Time    Color YELLOW 05/27/2010 09:37 AM    Appearance CLEAR 05/27/2010 09:37 AM    Specific gravity 1.025 05/27/2010 09:37 AM    pH (UA) 6.0 05/27/2010 09:37 AM    Protein NEGATIVE  05/27/2010 09:37 AM    Glucose NEGATIVE  05/27/2010 09:37 AM    Ketone NEGATIVE  05/27/2010 09:37 AM    Bilirubin NEGATIVE  05/27/2010 09:37 AM    Urobilinogen 0.2 05/27/2010 09:37 AM    Nitrites NEGATIVE  05/27/2010 09:37 AM    Leukocyte Esterase NEGATIVE  05/27/2010 09:37 AM         Medications Reviewed:     Current Facility-Administered Medications   Medication Dose Route Frequency    albumin human 25% (BUMINATE) solution 25 g  25 g IntraVENous BID    potassium chloride SR (KLOR-CON 10) tablet 40 mEq  40 mEq Oral BID    dilTIAZem (CARDIZEM) 125 mg in 0.9% sodium chloride 125 mL infusion  0-15 mg/hr IntraVENous TITRATE    ceFAZolin (ANCEF) 1 g in sterile water (preservative free) 10 mL IV syringe  1 g IntraVENous Q12H    sodium chloride (NS) flush 5-40 mL  5-40 mL IntraVENous Q8H    sodium chloride (NS) flush 5-40 mL  5-40 mL IntraVENous PRN    acetaminophen (TYLENOL) tablet 650 mg  650 mg Oral Q6H PRN    Or    acetaminophen (TYLENOL) suppository 650 mg  650 mg Rectal Q6H PRN    polyethylene glycol (MIRALAX) packet 17 g  17 g Oral DAILY PRN    ondansetron (ZOFRAN ODT) tablet 4 mg  4 mg Oral Q8H PRN    Or    ondansetron (ZOFRAN) injection 4 mg  4 mg IntraVENous Q6H PRN    enoxaparin (LOVENOX) injection 30 mg  30 mg SubCUTAneous DAILY    furosemide (LASIX) injection 40 mg  40 mg IntraVENous BID    haloperidol lactate (HALDOL) injection 1 mg  1 mg IntraMUSCular Q6H PRN     ______________________________________________________________________  EXPECTED LENGTH OF STAY: - - -  ACTUAL LENGTH OF STAY:          1      Please note that this dictation was completed with [employer], the computer voice recognition software. Quite often unanticipated grammatical, syntax, homophones, and other interpretive errors are inadvertently transcribed by the computer software. Please disregard these errors. Please excuse any errors that have escaped final proofreading.                 Anjum Mcfarland MD

## 2022-03-25 NOTE — PROGRESS NOTES
More alert today. Had had a good urine output and his leg edema is improved  HR well controlled on dilt gtt  Will transition to po dilt in small doses. Midodrine for BP support. Continue albumin infusions with IV diuretics.

## 2022-03-25 NOTE — PROGRESS NOTES
Problem: Self Care Deficits Care Plan (Adult)  Goal: *Acute Goals and Plan of Care (Insert Text)  Description:   FUNCTIONAL STATUS PRIOR TO ADMISSION: Patient was modified independent using a rollator for functional mobility. HOME SUPPORT: Lived in [facility] where he used a rollator for mobility and required assist for ADL/IADL. Occupational Therapy Goals  Initiated 3/25/2022  1. Patient will perform grooming with minimal assistance/contact guard assist within 7 day(s). 2.  Patient will perform upper body dressing with minimal assistance/contact guard assist within 7 day(s). 3.  Patient will perform lower body dressing with minimal assistance/contact guard assist within 7 day(s). 4.  Patient will perform all aspects of toileting with minimal assistance/contact guard assist within 7 day(s). 5.  Patient will utilize energy conservation techniques during functional activities with verbal cues within 7 day(s).    3/25/2022 1143 by Jennifer Dubose OT  Outcome: Not Met   OCCUPATIONAL THERAPY EVALUATION  Patient: Emory Buckner (14 y.o. male)  Date: 3/25/2022  Primary Diagnosis: A-fib ([facility] [address] [I48.91]  Hypoxemia [R09.02]        Precautions: Falls       ASSESSMENT  Based on the objective data described below, the patient presents with impaired balance, strength, and ability to complete ADL at baseline. Patient received reclined in bed, requesting to use bathroom. Currently on 1L O2 via NC, doffed for functional mobility to bathroom with O2 95% seated EOB. Completed transfers to EOB and to bathroom with Min-Mod A with cueing for correct RW manipulation and navigation in room as patient is blind in R eye. Patient voided, required Max A for bowel hygiene in standing. Returned to room, declining to sit in bedside chair, returning to bed with Max A for LE management. Patient donned NC with 1L as SpO2 in 80s with poor pleth, short rest break returned to 98%, tachy to 130s in bathroom with activity.  After ambulation back to bed, Max . Patient benefits from cues for focused breathing as patient's HR increases with anxiety. Will likely benefit from rehab and increased assist with ADL upon discharge to maximize ADL independence and safety in [place]. Current Level of Function Impacting Discharge (ADLs/self-care): up to total A ADL    Functional Outcome Measure: The patient scored Total: 30/100 on the Barthel Index outcome measure which is indicative of 70% impaired ability to care for basic self needs/dependency on others; inferred 90% dependency on others for instrumental ADLs. Other factors to consider for discharge: below baseline, tachycardic with activity     Patient will benefit from skilled therapy intervention to address the above noted impairments. PLAN :  Recommendations and Planned Interventions: self care training, functional mobility training, therapeutic exercise, balance training, therapeutic activities, endurance activities, patient education, home safety training and family training/education    Frequency/Duration: Patient will be followed by occupational therapy 4 times a week to address goals. Recommendation for discharge: (in order for the patient to meet his/her long term goals)  Therapy up to 5 days/week in SNF setting    This discharge recommendation:  Has not yet been discussed the attending provider and/or case management    IF patient discharges home will need the following DME: TBD pending progress       SUBJECTIVE:   Patient stated Was I successful? Aroldo Kuhn in having a BM    OBJECTIVE DATA SUMMARY:   HISTORY:   Past Medical History:   Diagnosis Date    Arthritis     OA (shoulders)    Cancer ([address] 2002    prostate    Fracture 2004    leg (w/ankle bracing)    Glaucoma     Hearing loss     Polymyalgia rheumatica (HCC)     Radiation proctitis     Spinal stenosis      Past Surgical History:   Procedure Laterality Date    ENDOSCOPY, COLON, DIAGNOSTIC  7/2005 ([facility])    HX APPENDECTOMY  1995    HX HEENT  5/2005    cat, ext. (right)-(Goldmann)    HX HERNIA REPAIR  6/2004    Right       Expanded or extensive additional review of patient history:     Home Situation  Home Environment: Assisted living  [address] [place] [address] Name: Juliet KIRKPATRICK  One/Two Story Residence: One story  Living Alone: No  Support Systems: [place]  Patient Expects to be Discharged to[de-identified] Home  Current DME Used/Available at Home: jennifer Saucedo    Hand dominance: Right    EXAMINATION OF PERFORMANCE DEFICITS:  Cognitive/Behavioral Status:  Neurologic State: Confused; Alert  Orientation Level: Oriented to person;Disoriented to place; Disoriented to situation;Disoriented to time  Cognition: Follows commands  Perception: Cues to attend to right side of body (d/t R eye blindness)  Perseveration: No perseveration noted  Safety/Judgement: Decreased awareness of need for safety;Decreased awareness of need for assistance;Decreased awareness of environment;Decreased insight into deficits    Skin: intact    Edema: BLE    Hearing: Auditory  Auditory Impairment: Hard of hearing, bilateral    Vision/Perceptual:                           Acuity: Impaired near vision; Impaired far vision (R eye)         Range of Motion:  AROM: Generally decreased, functional                         Strength:  Strength: Generally decreased, functional                Coordination:  Coordination: Within functional limits  Fine Motor Skills-Upper: Left Intact; Right Intact    Gross Motor Skills-Upper: Left Intact; Right Intact    Tone & Sensation:  Tone: Normal  Sensation: Impaired                      Balance:  Sitting: Impaired; With support  Sitting - Static: Good (unsupported)  Sitting - Dynamic: Fair (occasional)  Standing: Impaired; With support  Standing - Static: Constant support;Good  Standing - Dynamic : Constant support; Fair    Functional Mobility and Transfers for ADLs:  Bed Mobility:  Rolling: Supervision  Supine to Sit: Minimum assistance  Sit to Supine: Maximum assistance (for LE management)  Scooting: Maximum assistance;Assist x2    Transfers:  Sit to Stand: Minimum assistance;Assist x1  Stand to Sit: Minimum assistance;Assist x1  Bathroom Mobility: Moderate assistance  Toilet Transfer : Moderate assistance    ADL Assessment:  Feeding: Moderate assistance    Oral Facial Hygiene/Grooming: Minimum assistance    Bathing: Maximum assistance    Upper Body Dressing: Moderate assistance    Lower Body Dressing: Total assistance    Toileting: Maximum assistance                ADL Intervention and task modifications:                           Lower Body Dressing Assistance  Socks: Total assistance (dependent)  Position Performed: Supine    Toileting  Toileting Assistance: Maximum assistance  Bowel Hygiene: Maximum assistance  Clothing Management: Maximum assistance  Cues: Physical assistance for pants up;Physical assistance for pants down  Adaptive Equipment: Grab bars; Walker    Cognitive Retraining  Safety/Judgement: Decreased awareness of need for safety;Decreased awareness of need for assistance;Decreased awareness of environment;Decreased insight into deficits    Functional Measure:  Barthel Index:    Bathin  Bladder: 5  Bowels: 5  Groomin  Dressin  Feedin  Mobility: 0  Stairs: 0  Toilet Use: 0  Transfer (Bed to Chair and Back): 10  Total: 30/100        The Barthel ADL Index: Guidelines  1. The index should be used as a record of what a patient does, not as a record of what a patient could do. 2. The main aim is to establish degree of independence from any help, physical or verbal, however minor and for whatever reason. 3. The need for supervision renders the patient not independent. 4. A patient's performance should be established using the best available evidence. Asking the patient, friends/relatives and nurses are the usual sources, but direct observation and common sense are also important.  However direct testing is not needed. 5. Usually the patient's performance over the preceding 24-48 hours is important, but occasionally longer periods will be relevant. 6. Middle categories imply that the patient supplies over 50 per cent of the effort. 7. Use of aids to be independent is allowed. Luz Elena Mayers., Barthel, D.W. (4835). Functional evaluation: the Barthel Index. [facility] (14)2. DEBBIE Lockwood, Viridiana Fraga., Luis Felipe Hurley., [place], [address] (1999). Measuring the change indisability after inpatient rehabilitation; comparison of the responsiveness of the Barthel Index and Functional [place] Measure. Journal of Neurology, Neurosurgery, and Psychiatry, 66(4), 965-917. SIDDHARTHA Giang, CHRISTY Doherty, & Garcia Gardner MEVA. (2004.) Assessment of post-stroke quality of life in cost-effectiveness studies: The usefulness of the Barthel Index and the EuroQoL-5D. Quality of Life Research, 15, 633-00         Occupational Therapy Evaluation Charge Determination   History Examination Decision-Making   LOW Complexity : Brief history review  MEDIUM Complexity : 3-5 performance deficits relating to physical, cognitive , or psychosocial skils that result in activity limitations and / or participation restrictions LOW Complexity : No comorbidities that affect functional and no verbal or physical assistance needed to complete eval tasks       Based on the above components, the patient evaluation is determined to be of the following complexity level: LOW   Pain Rating:  None reported    Activity Tolerance:   Poor and requires frequent rest breaks    After treatment patient left in no apparent distress:    Supine in bed, Heels elevated for pressure relief, Call bell within reach, Bed / chair alarm activated, Caregiver / family present and Side rails x 3    COMMUNICATION/EDUCATION:   The patients plan of care was discussed with: Physical therapist and Registered nurse.      Home safety education was provided and the patient/caregiver indicated understanding., Patient/family have participated as able in goal setting and plan of care. , and Patient/family agree to work toward stated goals and plan of care. This patients plan of care is appropriate for delegation to TERRA.     Thank you for this referral.  Nestor Yoo, OT

## 2022-03-25 NOTE — CONSULTS
Cardiology Note dictated # [id number]  Imp:  Admitted with unwitnessed fall  Chronic systolic heart failure: he has been seen by anyone in our practice since March 27, 2019.  At that time , his EF was 40-45%  Aortic stenosis: mild to moderate by echo in 2018  Persistent atrial fibrillation: he is not on anticoagulation 2/2 GIB  Recommendations:  Echo to update his ejection fraction and his degree of aortic stenosis  IV loop diuretics as ordered  Add IV albumin to enhance fluid mobilization  Poor candidate for anticoagulation  Add back carvedilol when he is able to take pills and discontinue diltiazem gtt  Further recommendations to follow  Thank you for this referral  Deanne Esteves MD  Interventional Cardiology  [facility]

## 2022-03-25 NOTE — PROGRESS NOTES
Problem: Mobility Impaired (Adult and Pediatric)  Goal: *Acute Goals and Plan of Care (Insert Text)  Description: FUNCTIONAL STATUS PRIOR TO ADMISSION: Patient was modified independent using a rollator for functional mobility. Unknown how far he would ambulate at a time at baseline. Lives in group home. HOME SUPPORT PRIOR TO ADMISSION: Lives at [facility]. Admitted after a fall. Has a daughter involved in his care. Physical Therapy Goals  Initiated 3/25/2022  1. Patient will move from supine to sit and sit to supine , scoot up and down, and roll side to side in bed with modified independence within 7 day(s). 2.  Patient will transfer from bed to chair and chair to bed with supervision/set-up using the least restrictive device within 7 day(s). 3.  Patient will perform sit to stand with supervision/set-up within 7 day(s). 4.  Patient will ambulate with supervision/set-up for 50 feet with the least restrictive device within 7 day(s). Outcome: Progressing Towards Goal   PHYSICAL THERAPY EVALUATION  Patient: Kari Ormond (31 y.o. male)  Date: 3/25/2022  Primary Diagnosis: A-fib ([facility]) [I48.91]  Hypoxemia [R09.02]        Precautions:   Fall,Bed Alarm,DNR      ASSESSMENT  Based on the objective data described below, the patient presents with impaired balance, tachycardia at rest and with activity, decreased activity tolerance, wheezing, generalized weakness, and confusion following admission for afib with RVR and cellulitis of LEs. Received supine in bed on 1L/min, mild wheezing noted at rest and  at rest. Overall CGA-min A for OOB and ambulation x5 ft with min A and RW. Wheezing increased with activity and patient very anxious, SOB, and reporting fatigue. HR up to 162 with minimal mobility. Returned to bed and SpO2 stable on room air but donned 1L/min for comfort. Recommend SNF at d/c. Current Level of Function Impacting Discharge (mobility/balance): min A    Functional Outcome Measure:   The patient scored Total Score: 2/28 on the Tinetti outcome measure which is indicative of high fall risk. Other factors to consider for discharge: on 1L/min     Patient will benefit from skilled therapy intervention to address the above noted impairments. PLAN :  Recommendations and Planned Interventions: bed mobility training, transfer training, gait training, therapeutic exercises, neuromuscular re-education, edema management/control, patient and family training/education, and therapeutic activities      Frequency/Duration: Patient will be followed by physical therapy:  5 times a week to address goals. Recommendation for discharge: (in order for the patient to meet his/her long term goals)  Therapy up to 5 days/week in SNF setting    This discharge recommendation:  Has been made in collaboration with the attending provider and/or case management    IF patient discharges home will need the following DME: to be determined (TBD)         SUBJECTIVE:   Patient stated I'm very sick.     OBJECTIVE DATA SUMMARY:   HISTORY:    Past Medical History:   Diagnosis Date    Arthritis     OA (shoulders)    Cancer ([address] 2002    prostate    Fracture 2004    leg (w/ankle bracing)    Glaucoma     Hearing loss     Polymyalgia rheumatica (HCC)     Radiation proctitis     Spinal stenosis      Past Surgical History:   Procedure Laterality Date    ENDOSCOPY, COLON, DIAGNOSTIC  7/2005    ([address])    HX APPENDECTOMY  1995    HX HEENT  5/2005    cat, ext. (right)-(Goldmann)    HX HERNIA REPAIR  6/2004    Right       Personal factors and/or comorbidities impacting plan of care: Ap 22 Environment: Assisted living  [address] [place] [address] Name: [facility]  One/Two Story Residence: One story  Living Alone: No  Support Systems: [place]  Patient Expects to be Discharged to[de-identified] Home  Current DME Used/Available at Home: jennifer Schwab    EXAMINATION/PRESENTATION/DECISION MAKING:   Critical Behavior:  Neurologic State: Confused,Alert  Orientation Level: Oriented to person,Disoriented to place,Disoriented to situation,Disoriented to time  Cognition: Follows commands  Safety/Judgement: Decreased awareness of need for safety,Decreased awareness of need for assistance,Decreased awareness of environment,Decreased insight into deficits  Hearing: Auditory  Auditory Impairment: Hard of hearing, bilateral  Range Of Motion:  AROM: Generally decreased, functional  Strength:    Strength: Generally decreased, functional  Tone & Sensation:   Tone: Normal  Sensation: Impaired  Coordination:  Coordination: Within functional limits  Vision:   Acuity: Impaired near vision; Impaired far vision (R eye)  Functional Mobility:  Bed Mobility:  Rolling: Supervision  Supine to Sit: Stand-by assistance  Sit to Supine: Moderate assistance  Scooting: Maximum assistance;Assist x2  Transfers:  Sit to Stand: Minimum assistance  Stand to Sit: Minimum assistance  Balance:   Sitting: Intact; Without support  Sitting - Static: Good (unsupported)  Sitting - Dynamic: Fair (occasional)  Standing: Impaired; With support  Standing - Static: Good  Standing - Dynamic : Fair  Ambulation/Gait Training:  Distance (ft): 5 Feet (ft)  Assistive Device: Gait belt;Walker, rolling  Ambulation - Level of Assistance: Minimal assistance  Gait Abnormalities: Shuffling gait  Base of Support: Widened  Speed/Tiki: Shuffled  Step Length: Right shortened;Left shortened      Functional Measure:  Tinetti test:    Sitting Balance: 1  Arises: 0  Attempts to Rise: 0  Immediate Standing Balance: 1  Standing Balance: 0  Nudged: 0  Eyes Closed: 0  Turn 360 Degrees - Continuous/Discontinuous: 0  Turn 360 Degrees - Steady/Unsteady: 0  Sitting Down: 0  Balance Score: 2 Balance total score  Indication of Gait: 0  R Step Length/Height: 0  L Step Length/Height: 0  R Foot Clearance: 0  L Foot Clearance: 0  Step Symmetry: 0  Step Continuity: 0  Path: 0  Trunk: 0  Walking Time: 0  Gait Score: 0 Gait total score  Total Score: 2/28 Overall total score         Tinetti Tool Score Risk of Falls  <19 = High Fall Risk  19-24 = Moderate Fall Risk  25-28 = Low Fall Risk  Tinetti ME. Performance-Oriented Assessment of Mobility Problems in Elderly Patients. Archer 66; [id number]. (Scoring Description: PT Bulletin Feb. 10, 1993)    Older adults: Loyall Sudheer et al, 2009; n = 1000 [facility] elderly evaluated with ABC, ROGELIO, ADL, and IADL)  · Mean ROGELIO score for males aged 69-68 years = 26.21(3.40)  · Mean ROGELIO score for females age 69-68 years = 25.16(4.30)  · Mean ROGELIO score for males over 80 years = 23.29(6.02)  · Mean ROGELIO score for females over 80 years = 17.20(8.32)        Physical Therapy Evaluation Charge Determination   History Examination Presentation Decision-Making   HIGH Complexity :3+ comorbidities / personal factors will impact the outcome/ POC  HIGH Complexity : 4+ Standardized tests and measures addressing body structure, function, activity limitation and / or participation in recreation  LOW Complexity : Stable, uncomplicated  Other outcome measures Tinetti 2/28  HIGH       Based on the above components, the patient evaluation is determined to be of the following complexity level: LOW     Pain Rating:  Did not complain of pain    Activity Tolerance:   Poor      After treatment patient left in no apparent distress:   Supine in bed, Heels elevated for pressure relief, Call bell within reach, Bed / chair alarm activated, and Side rails x 3    COMMUNICATION/EDUCATION:   The patients plan of care was discussed with: Occupational therapist and Registered nurse. Fall prevention education was provided and the patient/caregiver indicated understanding., Patient/family have participated as able in goal setting and plan of care. , and Patient/family agree to work toward stated goals and plan of care.     Thank you for this referral.  Dearl Kailash PT, DPT   Time Calculation: 11 mins

## 2022-03-25 NOTE — NURSE NAVIGATOR
Chart reviewed by Heart Failure Nurse Navigator. Heart Failure database completed. EF:  pending    ACEi/ARB/ARNi: echo pending    BB: echo pending    Aldosterone Antagonist: echo pending    Obstructive Sleep Apnea Screening: N/4 age   Referred to Sleep Medicine:     CRT not indicated     NYHA Functional Class requested via provider message on Lighting by [employer]. Heart Failure Teach Back in Patient Education. Heart Failure Avoiding Triggers on Discharge Instructions. Cardiologist: [facility]      Post discharge follow up phone call to be made within 48-72 hours of discharge.

## 2022-03-25 NOTE — PROGRESS NOTES
Transitions of Care Plan   RUR: 15% - moderate   Clinical Update: IV diuretics; therapy   Consults: Cardiology; Therapy   Baseline: ambulates with RW; daughter supportive and transports   Barriers to Discharge: medical   Disposition: return to [address] Estimated Discharge Date: 3/27/22  Cyril Mendez does not apply; dementia; resides in [facility] w memory care    Reason for Admission:   Monica Nolasco                  RUR Score:     15% - moderate             PCP: First and Last name:   Radha Salmeron MD - daughter requests new PCP      Name of Practice:    Are you a current patient: Yes/No:    Approximate date of last visit:    Can you participate in a virtual visit if needed:     Do you (patient/family) have any concerns for transition/discharge? Patient's daughter would like new PCP                  Plan for utilizing home health:   No    Current Advanced Directive/Advance Care Plan:  DNR; daughter to bring AMD in this evening for copy to be made and placed on hard chart    Healthcare Decision Maker:   Click here to complete [address] including selection of the Healthcare Decision Maker Relationship (ie \"Primary\")            Daughter Sathish Roth - p: [phone]    Transition of Care Plan:          CM spoke with patient's daughter regarding patient's initial baseline and disposition plan:    Baseline Assessment:  ADLs: ambulates with RW  Self-Care: [facility] assists patient as needed  Social Background: resides at [address]:   Insurance: Confirmed; [employer]  HF Patient for Dis[facility] Health: Agreeable    Disposition Plan:  Return to assisted living facility with [facility]; CM will request new PCP in [place] per daughter's request. Daughter to transport at discharge.     Georgina Rojas, MPH  Care Manager l [address]  Available via [facility] or      Care Management Interventions  PCP Verified by CM: Yes  Palliative Care Criteria Met (RRAT>21 & CHF Dx)?: No  Mode of Transport at Discharge:  Other (see comment) (Daughter)  Transition of Care Consult (CM Consult): Assisted Living  MyChart Signup: No  Discharge Durable Medical Equipment: No  Health Maintenance Reviewed: Yes  Physical Therapy Consult: Yes  Occupational Therapy Consult: Yes  Speech Therapy Consult: No  Support Systems: [place]  Confirm Follow Up Transport: Family  Discharge Location  Patient Expects to be Discharged to[de-identified] Home

## 2022-03-26 ENCOUNTER — APPOINTMENT (OUTPATIENT)
Dept: GENERAL RADIOLOGY | Age: 87
DRG: 291 | End: 2022-03-26
Attending: NURSE PRACTITIONER
Payer: MEDICARE

## 2022-03-26 ENCOUNTER — APPOINTMENT (OUTPATIENT)
Dept: GENERAL RADIOLOGY | Age: 87
DRG: 291 | End: 2022-03-26
Attending: STUDENT IN AN ORGANIZED HEALTH CARE EDUCATION/TRAINING PROGRAM
Payer: MEDICARE

## 2022-03-26 ENCOUNTER — APPOINTMENT (OUTPATIENT)
Dept: NON INVASIVE DIAGNOSTICS | Age: 87
DRG: 291 | End: 2022-03-26
Attending: HOSPITALIST
Payer: MEDICARE

## 2022-03-26 LAB
ANION GAP SERPL CALC-SCNC: 10 MMOL/L (ref 5–15)
BUN SERPL-MCNC: 46 MG/DL (ref 6–20)
BUN/CREAT SERPL: 24 (ref 12–20)
CALCIUM SERPL-MCNC: 8.6 MG/DL (ref 8.5–10.1)
CHLORIDE SERPL-SCNC: 104 MMOL/L (ref 97–108)
CO2 SERPL-SCNC: 22 MMOL/L (ref 21–32)
CREAT SERPL-MCNC: 1.89 MG/DL (ref 0.7–1.3)
ECHO AO ROOT DIAM: 3.5 CM
ECHO AO ROOT INDEX: 1.64 CM/M2
ECHO AR MAX VEL PISA: 3.4 M/S
ECHO AV MEAN GRADIENT: 21 MMHG
ECHO AV MEAN VELOCITY: 2.4 M/S
ECHO AV PEAK GRADIENT: 36 MMHG
ECHO AV PEAK GRADIENT: 37 MMHG
ECHO AV PEAK VELOCITY: 3 M/S
ECHO AV PEAK VELOCITY: 3.1 M/S
ECHO AV REGURGITANT PHT: 431.4 MILLISECOND
ECHO AV VTI: 54.2 CM
ECHO EST RA PRESSURE: 15 MMHG
ECHO LA DIAMETER INDEX: 2.3 CM/M2
ECHO LA DIAMETER: 4.9 CM
ECHO LA TO AORTIC ROOT RATIO: 1.4
ECHO LA VOL 4C: 93 ML (ref 18–58)
ECHO LA VOLUME INDEX A4C: 44 ML/M2 (ref 16–34)
ECHO LV E' LATERAL VELOCITY: 9 CM/S
ECHO LV E' SEPTAL VELOCITY: 5 CM/S
ECHO LV FRACTIONAL SHORTENING: 9 % (ref 28–44)
ECHO LV INTERNAL DIMENSION DIASTOLE INDEX: 2.49 CM/M2
ECHO LV INTERNAL DIMENSION DIASTOLIC: 5.3 CM (ref 4.2–5.9)
ECHO LV INTERNAL DIMENSION SYSTOLIC INDEX: 2.25 CM/M2
ECHO LV INTERNAL DIMENSION SYSTOLIC: 4.8 CM
ECHO LV IVSD: 0.8 CM (ref 0.6–1)
ECHO LV MASS 2D: 174.5 G (ref 88–224)
ECHO LV MASS INDEX 2D: 81.9 G/M2 (ref 49–115)
ECHO LV POSTERIOR WALL DIASTOLIC: 1 CM (ref 0.6–1)
ECHO LV RELATIVE WALL THICKNESS RATIO: 0.38
ECHO LVOT AREA: 3.5 CM2
ECHO LVOT AV VTI INDEX: 0.21
ECHO LVOT DIAM: 2.1 CM
ECHO LVOT STROKE VOLUME INDEX: 18.7 ML/M2
ECHO LVOT SV: 39.8 ML
ECHO LVOT VTI: 11.5 CM
ECHO MV REGURGITANT PEAK GRADIENT: 88 MMHG
ECHO MV REGURGITANT PEAK VELOCITY: 4.7 M/S
ECHO PV MAX VELOCITY: 0.5 M/S
ECHO PV PEAK GRADIENT: 1 MMHG
ECHO RIGHT VENTRICULAR SYSTOLIC PRESSURE (RVSP): 53 MMHG
ECHO RV FREE WALL PEAK S': 7 CM/S
ECHO RV TAPSE: 1.6 CM (ref 1.5–2)
ECHO TV REGURGITANT MAX VELOCITY: 3.1 M/S
ECHO TV REGURGITANT PEAK GRADIENT: 38 MMHG
GLUCOSE SERPL-MCNC: 110 MG/DL (ref 65–100)
POTASSIUM SERPL-SCNC: 5 MMOL/L (ref 3.5–5.1)
PROCALCITONIN SERPL-MCNC: 0.21 NG/ML
SODIUM SERPL-SCNC: 136 MMOL/L (ref 136–145)

## 2022-03-26 PROCEDURE — 84145 PROCALCITONIN (PCT): CPT

## 2022-03-26 PROCEDURE — 94640 AIRWAY INHALATION TREATMENT: CPT

## 2022-03-26 PROCEDURE — 94760 N-INVAS EAR/PLS OXIMETRY 1: CPT

## 2022-03-26 PROCEDURE — 65660000001 HC RM ICU INTERMED STEPDOWN

## 2022-03-26 PROCEDURE — 74011250637 HC RX REV CODE- 250/637: Performed by: HOSPITALIST

## 2022-03-26 PROCEDURE — 74011250636 HC RX REV CODE- 250/636: Performed by: INTERNAL MEDICINE

## 2022-03-26 PROCEDURE — 77030019905 HC CATH URETH INTMIT MDII -A

## 2022-03-26 PROCEDURE — 74011250637 HC RX REV CODE- 250/637: Performed by: INTERNAL MEDICINE

## 2022-03-26 PROCEDURE — 71045 X-RAY EXAM CHEST 1 VIEW: CPT

## 2022-03-26 PROCEDURE — 74011000250 HC RX REV CODE- 250: Performed by: HOSPITALIST

## 2022-03-26 PROCEDURE — 51798 US URINE CAPACITY MEASURE: CPT

## 2022-03-26 PROCEDURE — 80048 BASIC METABOLIC PNL TOTAL CA: CPT

## 2022-03-26 PROCEDURE — 36415 COLL VENOUS BLD VENIPUNCTURE: CPT

## 2022-03-26 PROCEDURE — P9047 ALBUMIN (HUMAN), 25%, 50ML: HCPCS | Performed by: INTERNAL MEDICINE

## 2022-03-26 PROCEDURE — 77010033678 HC OXYGEN DAILY

## 2022-03-26 PROCEDURE — 93306 TTE W/DOPPLER COMPLETE: CPT

## 2022-03-26 PROCEDURE — 74011250636 HC RX REV CODE- 250/636: Performed by: HOSPITALIST

## 2022-03-26 RX ORDER — HALOPERIDOL 5 MG/ML
2 INJECTION INTRAMUSCULAR
Status: COMPLETED | OUTPATIENT
Start: 2022-03-26 | End: 2022-03-27

## 2022-03-26 RX ADMIN — WATER 1 G: 1 INJECTION INTRAMUSCULAR; INTRAVENOUS; SUBCUTANEOUS at 13:24

## 2022-03-26 RX ADMIN — SODIUM CHLORIDE, PRESERVATIVE FREE 10 ML: 5 INJECTION INTRAVENOUS at 06:56

## 2022-03-26 RX ADMIN — WATER 1 G: 1 INJECTION INTRAMUSCULAR; INTRAVENOUS; SUBCUTANEOUS at 01:00

## 2022-03-26 RX ADMIN — SODIUM CHLORIDE, PRESERVATIVE FREE 10 ML: 5 INJECTION INTRAVENOUS at 22:00

## 2022-03-26 RX ADMIN — ALBUMIN (HUMAN) 25 G: 0.25 INJECTION, SOLUTION INTRAVENOUS at 09:47

## 2022-03-26 RX ADMIN — MIDODRINE HYDROCHLORIDE 10 MG: 5 TABLET ORAL at 17:21

## 2022-03-26 RX ADMIN — IPRATROPIUM BROMIDE AND ALBUTEROL SULFATE 3 ML: .5; 3 SOLUTION RESPIRATORY (INHALATION) at 18:13

## 2022-03-26 RX ADMIN — MIDODRINE HYDROCHLORIDE 10 MG: 5 TABLET ORAL at 13:24

## 2022-03-26 RX ADMIN — HALOPERIDOL LACTATE 1 MG: 5 INJECTION, SOLUTION INTRAMUSCULAR at 00:54

## 2022-03-26 RX ADMIN — ENOXAPARIN SODIUM 30 MG: 100 INJECTION SUBCUTANEOUS at 09:47

## 2022-03-26 RX ADMIN — DILTIAZEM HYDROCHLORIDE 15 MG: 30 TABLET, FILM COATED ORAL at 06:53

## 2022-03-26 RX ADMIN — ALBUMIN (HUMAN) 25 G: 0.25 INJECTION, SOLUTION INTRAVENOUS at 17:22

## 2022-03-26 RX ADMIN — HALOPERIDOL LACTATE 1 MG: 5 INJECTION, SOLUTION INTRAMUSCULAR at 18:54

## 2022-03-26 RX ADMIN — DILTIAZEM HYDROCHLORIDE 15 MG: 30 TABLET, FILM COATED ORAL at 13:23

## 2022-03-26 RX ADMIN — IPRATROPIUM BROMIDE AND ALBUTEROL SULFATE 3 ML: .5; 3 SOLUTION RESPIRATORY (INHALATION) at 00:54

## 2022-03-26 RX ADMIN — DILTIAZEM HYDROCHLORIDE 15 MG: 30 TABLET, FILM COATED ORAL at 17:21

## 2022-03-26 RX ADMIN — FUROSEMIDE 40 MG: 10 INJECTION, SOLUTION INTRAMUSCULAR; INTRAVENOUS at 17:21

## 2022-03-26 RX ADMIN — METOPROLOL TARTRATE 12.5 MG: 25 TABLET, FILM COATED ORAL at 21:33

## 2022-03-26 RX ADMIN — MIDODRINE HYDROCHLORIDE 10 MG: 5 TABLET ORAL at 09:54

## 2022-03-26 RX ADMIN — POTASSIUM CHLORIDE 40 MEQ: 750 TABLET, EXTENDED RELEASE ORAL at 09:46

## 2022-03-26 RX ADMIN — METOPROLOL TARTRATE 12.5 MG: 25 TABLET, FILM COATED ORAL at 09:47

## 2022-03-26 RX ADMIN — FUROSEMIDE 40 MG: 10 INJECTION, SOLUTION INTRAMUSCULAR; INTRAVENOUS at 09:48

## 2022-03-26 NOTE — PROGRESS NOTES
[facility] Adult  Hospitalist Group                                                                                          Hospitalist Progress Note  Estelle Ventura MD  Answering service: [phone] from in house phone        Date of Service:  3/26/2022  NAME:  Kya Martinez  :  1923  MRN:  [id number]      Admission Summary:   Kya Martinez is a 80 y.o. male who presents with fall and tachycardia  Pt lives at [facility], pt is very hard of hearing. History mainly from his daughter. Mr. Godfrey Sarah is a 79yo male who presents to the short pumpER with complaints of a fall.  Per EMS, the patient was found on the ground. Sofya Saavedra is unclear how he fell. Theodore Appiah was noted to be hypoxic with an oxygen saturation in the 70s, per EMS. Christie Chew put him on nasal cannula and his sats improved.  No other complaints reported. Noticed rapid afib in ER. So cardizem drip started. Interval history / Subjective:      No acute events overnight. Patient seen and examined by overnight NP due to concerns of sundowning and reported shortness of breath. Patient is seen and examined at bedside this morning. He is mildly confused and requesting trying to get to return to [place]. However denies any chest pain or shortness of breath. Assessment & Plan:       Decompensated CHF reduced ejection fraction chronic systolic CHF  -Lasix 40 mg IV twice daily  -Continue support blood pressure with IV albumin    Afib  - not a AC candidate due to fall and history of GIB. -Continue asa, cardizem drip, echo. - Cardiologist. Consult.      Bilateral leg cellulitis  - iv ancef (dose adjusted), wound care     AKASH vs CKD 3  -no recent record to compare, will monitor cr     Debility and failure to thrive  - PT/OT     Hypokalemia  -Monitor and replete p.o.        Code status: DNR  DVT prophylaxis: Lovenox    Care Plan discussed with: Patient/Family and Nurse  Anticipated Disposition: SNF/LTC  Anticipated Discharge: 24 hours to 48 hours     Hospital Problems  Date Reviewed: 3/24/2022          Codes Class Noted POA    Hypoxemia ICD-10-CM: R09.02  ICD-9-CM: 799.02  3/24/2022 Unknown        A-fib [facility]) ICD-10-CM: I48.91  ICD-9-CM: 427.31  3/24/2022 Unknown                Review of Systems:   Review of systems not obtained due to patient factors. Vital Signs:    Last 24hrs VS reviewed since prior progress note. Most recent are:  Visit Vitals  /61   Pulse 93   Temp 97.8 °F (36.6 °C)   Resp 16   Ht 5' 9\" (1.753 m)   Wt 97.1 kg (214 lb)   SpO2 100%   BMI 31.60 kg/m²         Intake/Output Summary (Last 24 hours) at 3/26/2022 0810  Last data filed at 3/26/2022 0602  Gross per 24 hour   Intake 0 ml   Output 676 ml   Net -676 ml        Physical Examination:     I had a face to face encounter with this patient and independently examined them on 3/26/2022 as outlined below:          General : no acute distress, resting in bed,  HEENT: PEERL, EOMI, moist mucus membrane  Neck: supple, no JVD, no meningeal signs  Chest: Anterior breath sounds clear to auscultation bilaterally   CVS: S1 S2 heard, Capillary refill less than 2 seconds  Abd: soft/ Non tender, non distended, BS physiological,   Ext: no clubbing, no cyanosis, 1+ edema to level of upper thighs, brisk 2+ DP pulses  Neuro/Psych: pleasant mood and affect, CN 2-12 grossly intact  Skin: warm     Data Review:    I personally reviewed  Image and labs      Labs:     Recent Labs     03/25/22 0212 03/24/22  0836   WBC 10.5 14.4*   HGB 11.3* 12.0*   HCT 35.6* 38.0    269     Recent Labs     03/25/22 0212 03/24/22  0836    139   K 3.4* 3.9    100   CO2 27 29   BUN 39* 42*   CREA 1.71* 1.84*   GLU 99 110*   CA 8.2* 8.8   MG 2.1  --    PHOS 3.6  --      Recent Labs     03/25/22 0212 03/24/22  0836   ALT 13 15   AP 64 70   TBILI 0.9 1.9*   TP 5.5* 6.9   ALB 2.6* 3.2*   GLOB 2.9 3.7     No results for input(s): INR, PTP, APTT, INREXT, INREXT in the last 72 hours.    No results for input(s): FE, TIBC, PSAT, FERR in the last 72 hours. No results found for: FOL, RBCF   No results for input(s): PH, PCO2, PO2 in the last 72 hours. No results for input(s): CPK, CKNDX, TROIQ in the last 72 hours.     No lab exists for component: CPKMB  No results found for: CHOL, CHOLX, CHLST, CHOLV, HDL, HDLP, LDL, LDLC, DLDLP, TGLX, TRIGL, TRIGP, CHHD, CHHDX  No results found for: [facility]  Lab Results   Component Value Date/Time    Color YELLOW 05/27/2010 09:37 AM    Appearance CLEAR 05/27/2010 09:37 AM    Specific gravity 1.025 05/27/2010 09:37 AM    pH (UA) 6.0 05/27/2010 09:37 AM    Protein NEGATIVE  05/27/2010 09:37 AM    Glucose NEGATIVE  05/27/2010 09:37 AM    Ketone NEGATIVE  05/27/2010 09:37 AM    Bilirubin NEGATIVE  05/27/2010 09:37 AM    Urobilinogen 0.2 05/27/2010 09:37 AM    Nitrites NEGATIVE  05/27/2010 09:37 AM    Leukocyte Esterase NEGATIVE  05/27/2010 09:37 AM         Medications Reviewed:     Current Facility-Administered Medications   Medication Dose Route Frequency    albumin human 25% (BUMINATE) solution 25 g  25 g IntraVENous BID    potassium chloride SR (KLOR-CON 10) tablet 40 mEq  40 mEq Oral BID    albuterol-ipratropium (DUO-NEB) 2.5 MG-0.5 MG/3 ML  3 mL Nebulization Q6H PRN    midodrine (PROAMATINE) tablet 10 mg  10 mg Oral TID WITH MEALS    dilTIAZem IR (CARDIZEM) tablet 15 mg  15 mg Oral TIDAC    metoprolol tartrate (LOPRESSOR) tablet 12.5 mg  12.5 mg Oral Q12H    dilTIAZem (CARDIZEM) 125 mg in 0.9% sodium chloride 125 mL infusion  0-15 mg/hr IntraVENous TITRATE    ceFAZolin (ANCEF) 1 g in sterile water (preservative free) 10 mL IV syringe  1 g IntraVENous Q12H    sodium chloride (NS) flush 5-40 mL  5-40 mL IntraVENous Q8H    sodium chloride (NS) flush 5-40 mL  5-40 mL IntraVENous PRN    acetaminophen (TYLENOL) tablet 650 mg  650 mg Oral Q6H PRN    Or    acetaminophen (TYLENOL) suppository 650 mg  650 mg Rectal Q6H PRN    polyethylene glycol (MIRALAX) packet 17 g  17 g Oral DAILY PRN    ondansetron (ZOFRAN ODT) tablet 4 mg  4 mg Oral Q8H PRN    Or    ondansetron (ZOFRAN) injection 4 mg  4 mg IntraVENous Q6H PRN    enoxaparin (LOVENOX) injection 30 mg  30 mg SubCUTAneous DAILY    furosemide (LASIX) injection 40 mg  40 mg IntraVENous BID    haloperidol lactate (HALDOL) injection 1 mg  1 mg IntraMUSCular Q6H PRN     ______________________________________________________________________  EXPECTED LENGTH OF STAY: - - -  ACTUAL LENGTH OF STAY:          2      Please note that this dictation was completed with [employer], the computer voice recognition software. Quite often unanticipated grammatical, syntax, homophones, and other interpretive errors are inadvertently transcribed by the computer software. Please disregard these errors. Please excuse any errors that have escaped final proofreading.                 Sharmila Ghosh MD

## 2022-03-26 NOTE — PROGRESS NOTES
Overnight Hospitalist Progress Note    Name: Andree Gambino  YOB: 1923  MRN: [id number]  Admission Date: 3/24/2022    Date of service: 03/26/22,     __________________________________________________________    Notified at approximately 12:30 AM by the patient's primary RN on 4W that patient kept yelling \"help me\" and stating that he could not breathe. He is hard of hearing and blind, but at baseline he is mostly coherent and oriented to person and place. On exam, his SPO2 was 99% on 3 LNC and his respiratory rate was about 24. He did not appear to be in any distress. Some moderate expiratory wheezing was noted on exam which resolved quickly after DuoNeb treatment. Tylenol was given for agitation. CXR noted small to moderate bilateral pleural effusions with increased bibasilar airspace disease/atelectasis. Patient is a 49-year-old male admitted 3/24/2021 after a fall. He was noted to be hypoxic but improved with nasal cannula oxygen. At baseline he is in A. fib, but was noted to be in A. fib with RVR and diltiazem drip was started, but discontinued around midnight for hypotension. Additional PMH includes CHF, bilateral leg cellulitis, CKD 3. Currently on Ancef, oral diltiazem, furosemide, metoprolol and midodrine    Shortness of breath, without hypoxia  CXR notes increasing pleural effusions.   Currently on Ancef, resting comfortably on nasal cannula  · Continue current plan of care for now    Patient Vitals for the past 12 hrs:   Temp Pulse Resp BP SpO2   03/26/22 0401 97.5 °F (36.4 °C) 84 15 105/65 100 %   03/26/22 0400  88      03/26/22 0200  (!) 102      03/26/22 0000    (!) 86/63    03/25/22 2300 97.5 °F (36.4 °C) 89 22 97/61 98 %   03/25/22 2250 97.6 °F (36.4 °C) 95 20 (!) 100/57 100 %   03/25/22 2200  (!) 107      03/25/22 2000 99 °F (37.2 °C) 96 16 100/71 99 %   03/25/22 1901     100 %   03/25/22 1800  (!) 124          No results found for this or any previous visit (from the past 12 hour(s)).         __________________________________________________________    CYNTHIA Mcnamara, RN, NP-C  [phone] or via [address] [place] [address]

## 2022-03-26 NOTE — PROGRESS NOTES
1800: patient with worsening SOB. sats WNL. patient complaining of not being able to breath. Patient with expiratory wheezes and some crackles. Patient retaining urine and has had to be straight cathed multiple times. Davis placed per order to help diurese patient.

## 2022-03-26 NOTE — PROGRESS NOTES
1930: Bedside shift change report given to Elisa Couch RN (oncoming nurse) by Mundo Adan RN (offgoing nurse). Report included the following information SBAR, Kardex, Intake/Output, MAR, and Recent Results. 2000: pt refused to be turned. 0021: pt dyspneic at rest and tachypneic. O2 99% on 3L NC. Pt states \"he can't breath\" Provider notified. Orders placed for CXR    0230: wound drsgs changed per order    0530: bladder scanned performed with an estimate of 425mL in bladder. 7795: straight cath performed with Christos Munroe RN using sterile technique per protocol. 375mL out. 0730: Bedside shift change report given to Seng Moncada RN (oncoming nurse) by Elisa Couch RN (offgoing nurse). Report included the following information SBAR, Kardex, Intake/Output, MAR and Recent Results. Problem: Pressure Injury - Risk of  Goal: *Prevention of pressure injury  Description: Document Phillip Scale and appropriate interventions in the flowsheet.   Outcome: Progressing Towards Goal  Note: Pressure Injury Interventions:  Sensory Interventions: Minimize linen layers    Moisture Interventions: Absorbent underpads    Activity Interventions: Pressure redistribution bed/mattress(bed type)    Mobility Interventions: Pressure redistribution bed/mattress (bed type)    Nutrition Interventions: Document food/fluid/supplement intake    Friction and Shear Interventions: Minimize layers

## 2022-03-27 ENCOUNTER — APPOINTMENT (OUTPATIENT)
Dept: CT IMAGING | Age: 87
DRG: 291 | End: 2022-03-27
Attending: STUDENT IN AN ORGANIZED HEALTH CARE EDUCATION/TRAINING PROGRAM
Payer: MEDICARE

## 2022-03-27 LAB
ANION GAP SERPL CALC-SCNC: 7 MMOL/L (ref 5–15)
BUN SERPL-MCNC: 53 MG/DL (ref 6–20)
BUN/CREAT SERPL: 24 (ref 12–20)
CALCIUM SERPL-MCNC: 8.6 MG/DL (ref 8.5–10.1)
CHLORIDE SERPL-SCNC: 101 MMOL/L (ref 97–108)
CO2 SERPL-SCNC: 25 MMOL/L (ref 21–32)
CREAT SERPL-MCNC: 2.25 MG/DL (ref 0.7–1.3)
CREAT UR-MCNC: 83.8 MG/DL
GLUCOSE SERPL-MCNC: 96 MG/DL (ref 65–100)
POTASSIUM SERPL-SCNC: 5.1 MMOL/L (ref 3.5–5.1)
POTASSIUM UR-SCNC: 56 MMOL/L
PROT UR-MCNC: 220 MG/DL (ref 0–11.9)
SODIUM SERPL-SCNC: 133 MMOL/L (ref 136–145)
SODIUM UR-SCNC: 16 MMOL/L

## 2022-03-27 PROCEDURE — 80048 BASIC METABOLIC PNL TOTAL CA: CPT

## 2022-03-27 PROCEDURE — P9047 ALBUMIN (HUMAN), 25%, 50ML: HCPCS | Performed by: INTERNAL MEDICINE

## 2022-03-27 PROCEDURE — 82570 ASSAY OF URINE CREATININE: CPT

## 2022-03-27 PROCEDURE — 94760 N-INVAS EAR/PLS OXIMETRY 1: CPT

## 2022-03-27 PROCEDURE — 77010033678 HC OXYGEN DAILY

## 2022-03-27 PROCEDURE — 65660000001 HC RM ICU INTERMED STEPDOWN

## 2022-03-27 PROCEDURE — 74011000250 HC RX REV CODE- 250: Performed by: HOSPITALIST

## 2022-03-27 PROCEDURE — 74011250636 HC RX REV CODE- 250/636: Performed by: INTERNAL MEDICINE

## 2022-03-27 PROCEDURE — 36415 COLL VENOUS BLD VENIPUNCTURE: CPT

## 2022-03-27 PROCEDURE — 74011250636 HC RX REV CODE- 250/636: Performed by: HOSPITALIST

## 2022-03-27 PROCEDURE — 74011250636 HC RX REV CODE- 250/636: Performed by: NURSE PRACTITIONER

## 2022-03-27 PROCEDURE — 84300 ASSAY OF URINE SODIUM: CPT

## 2022-03-27 PROCEDURE — 94640 AIRWAY INHALATION TREATMENT: CPT

## 2022-03-27 PROCEDURE — 74011250637 HC RX REV CODE- 250/637: Performed by: INTERNAL MEDICINE

## 2022-03-27 PROCEDURE — 84133 ASSAY OF URINE POTASSIUM: CPT

## 2022-03-27 PROCEDURE — 84156 ASSAY OF PROTEIN URINE: CPT

## 2022-03-27 RX ORDER — GUAIFENESIN/DEXTROMETHORPHAN 100-10MG/5
5 SYRUP ORAL
Status: DISCONTINUED | OUTPATIENT
Start: 2022-03-27 | End: 2022-03-29 | Stop reason: HOSPADM

## 2022-03-27 RX ORDER — TRIAMCINOLONE ACETONIDE 1 MG/G
OINTMENT TOPICAL
Status: DISCONTINUED | OUTPATIENT
Start: 2022-03-27 | End: 2022-03-28

## 2022-03-27 RX ORDER — IPRATROPIUM BROMIDE AND ALBUTEROL SULFATE 2.5; .5 MG/3ML; MG/3ML
3 SOLUTION RESPIRATORY (INHALATION)
Status: DISCONTINUED | OUTPATIENT
Start: 2022-03-28 | End: 2022-03-29

## 2022-03-27 RX ADMIN — SODIUM CHLORIDE, PRESERVATIVE FREE 10 ML: 5 INJECTION INTRAVENOUS at 06:00

## 2022-03-27 RX ADMIN — ALBUMIN (HUMAN) 25 G: 0.25 INJECTION, SOLUTION INTRAVENOUS at 18:25

## 2022-03-27 RX ADMIN — DILTIAZEM HYDROCHLORIDE 15 MG: 30 TABLET, FILM COATED ORAL at 12:44

## 2022-03-27 RX ADMIN — DILTIAZEM HYDROCHLORIDE 15 MG: 30 TABLET, FILM COATED ORAL at 18:25

## 2022-03-27 RX ADMIN — IPRATROPIUM BROMIDE AND ALBUTEROL SULFATE 3 ML: .5; 3 SOLUTION RESPIRATORY (INHALATION) at 13:46

## 2022-03-27 RX ADMIN — METOPROLOL TARTRATE 12.5 MG: 25 TABLET, FILM COATED ORAL at 21:43

## 2022-03-27 RX ADMIN — ALBUMIN (HUMAN) 25 G: 0.25 INJECTION, SOLUTION INTRAVENOUS at 09:21

## 2022-03-27 RX ADMIN — SODIUM CHLORIDE, PRESERVATIVE FREE 10 ML: 5 INJECTION INTRAVENOUS at 21:53

## 2022-03-27 RX ADMIN — HALOPERIDOL LACTATE 2 MG: 5 INJECTION, SOLUTION INTRAMUSCULAR at 23:35

## 2022-03-27 RX ADMIN — MIDODRINE HYDROCHLORIDE 10 MG: 5 TABLET ORAL at 18:25

## 2022-03-27 RX ADMIN — HALOPERIDOL LACTATE 2 MG: 5 INJECTION, SOLUTION INTRAMUSCULAR at 21:43

## 2022-03-27 RX ADMIN — WATER 1 G: 1 INJECTION INTRAMUSCULAR; INTRAVENOUS; SUBCUTANEOUS at 02:07

## 2022-03-27 RX ADMIN — METOPROLOL TARTRATE 12.5 MG: 25 TABLET, FILM COATED ORAL at 09:21

## 2022-03-27 RX ADMIN — WATER 1 G: 1 INJECTION INTRAMUSCULAR; INTRAVENOUS; SUBCUTANEOUS at 12:41

## 2022-03-27 RX ADMIN — DILTIAZEM HYDROCHLORIDE 15 MG: 30 TABLET, FILM COATED ORAL at 09:21

## 2022-03-27 RX ADMIN — MIDODRINE HYDROCHLORIDE 10 MG: 5 TABLET ORAL at 12:41

## 2022-03-27 RX ADMIN — HALOPERIDOL LACTATE 2 MG: 5 INJECTION, SOLUTION INTRAMUSCULAR at 04:01

## 2022-03-27 RX ADMIN — ENOXAPARIN SODIUM 30 MG: 100 INJECTION SUBCUTANEOUS at 09:20

## 2022-03-27 RX ADMIN — IPRATROPIUM BROMIDE AND ALBUTEROL SULFATE 3 ML: .5; 3 SOLUTION RESPIRATORY (INHALATION) at 18:03

## 2022-03-27 NOTE — PROGRESS NOTES
[address] [facility] Adult  Hospitalist Group                                                                                          Hospitalist Progress Note  Adri Pearson MD  Answering service: [phone] from in house phone        Date of Service:  3/27/2022  NAME:  Sanjeev Villeda  :  1923  MRN:  [id number]      Admission Summary:   Sanjeev Villeda is a 80 y.o. male who presents with fall and tachycardia  Pt lives at [facility], pt is very hard of hearing. History mainly from his daughter. Mr. Mirza Yancey is a 81yo male who presents to the short pump[facility] with complaints of a fall.  Per EMS, the patient was found on the ground. Lulu Almanza is unclear how he fell. David Mcdaniel was noted to be hypoxic with an oxygen saturation in the 70s, per EMS. Dennys Haas put him on nasal cannula and his sats improved.  No other complaints reported. Noticed rapid afib in ER. So cardizem drip started. Interval history / Subjective:      No acute events overnight. Patient seen and examined this morning is intermittent episodes of sundowning/confusion. Assessment & Plan:       Decompensated CHF reduced ejection fraction chronic systolic CHF  -ECHO this admission with Severe global hypokinesis present. Severely reduced left ventricular systolic function with a visually estimated EF of 15 - 20%. -Hold Lasix given akash  -Continue bp support with IV albumin and midodrine  -Consult nephro for additional input with diuretics  -Palliative care consult    Acute Hypoxic Resp Failure  -Likely 2/2 CHF vs CAP  -Procal is borderline  -Obtain CT chest    AKASH vs CKD 3 / ?CRS  -Worsening renal function  -Hold LaSIX  -Nephro consult    Afib  -not a AC candidate due to fall and history of GIB.    -Continue asa  -D/C cardizem gtt  - Cardiology on consult    Bilateral leg cellulitis  - iv ancef (dose adjusted), wound care       Debility and failure to thrive  - PT/OT     Hypokalemia  -Monitor and replete p.o.        Code status: DNR  DVT prophylaxis: Lovenox    Care Plan discussed with: Patient/Family and Nurse  Anticipated Disposition: SNF/LTC  Anticipated Discharge: 24 hours to 48 hours     Hospital Problems  Date Reviewed: 3/24/2022          Codes Class Noted POA    Hypoxemia ICD-10-CM: R09.02  ICD-9-CM: 799.02  3/24/2022 Unknown        A-fib [facility]) ICD-10-CM: I48.91  ICD-9-CM: 427.31  3/24/2022 Unknown                Review of Systems:   Review of systems not obtained due to patient factors. Vital Signs:    Last 24hrs VS reviewed since prior progress note.  Most recent are:  Visit Vitals  BP (!) 91/49 (BP 1 Location: Left arm)   Pulse 88   Temp 97.5 °F (36.4 °C)   Resp 16   Ht 5' 9\" (1.753 m)   Wt 93.8 kg (206 lb 11.2 oz)   SpO2 99%   BMI 30.52 kg/m²         Intake/Output Summary (Last 24 hours) at 3/27/2022 0807  Last data filed at 3/27/2022 0300  Gross per 24 hour   Intake 974 ml   Output 1030 ml   Net -56 ml        Physical Examination:     I had a face to face encounter with this patient and independently examined them on 3/27/2022 as outlined below:          General : no acute distress, resting in bed,  HEENT: PEERL, EOMI, moist mucus membrane  Neck: supple, no JVD, no meningeal signs  Chest: Anterior breath sounds clear to auscultation bilaterally   CVS: S1 S2 heard, Capillary refill less than 2 seconds  Abd: soft/ Non tender, non distended, BS physiological,   Ext: no clubbing, no cyanosis, 1+ edema to level of upper thighs, brisk 2+ DP pulses  Neuro/Psych: pleasant mood and affect, CN 2-12 grossly intact  Skin: warm     Data Review:    I personally reviewed  Image and labs      Labs:     Recent Labs     03/25/22  0212 03/24/22  0836   WBC 10.5 14.4*   HGB 11.3* 12.0*   HCT 35.6* 38.0    269     Recent Labs     03/27/22  0254 03/26/22  1230 03/25/22 0212   * 136 138   K 5.1 5.0 3.4*    104 104   CO2 25 22 27   BUN 53* 46* 39*   CREA 2.25* 1.89* 1.71*   GLU 96 110* 99   CA 8.6 8.6 8.2*   MG  --   --  2.1   PHOS  -- --  3.6     Recent Labs     03/25/22  0212 03/24/22  0836   ALT 13 15   AP 64 70   TBILI 0.9 1.9*   TP 5.5* 6.9   ALB 2.6* 3.2*   GLOB 2.9 3.7     No results for input(s): INR, PTP, APTT, INREXT, INREXT in the last 72 hours. No results for input(s): FE, TIBC, PSAT, FERR in the last 72 hours. No results found for: FOL, RBCF   No results for input(s): PH, PCO2, PO2 in the last 72 hours. No results for input(s): CPK, CKNDX, TROIQ in the last 72 hours.     No lab exists for component: CPKMB  No results found for: CHOL, CHOLX, CHLST, CHOLV, HDL, HDLP, LDL, LDLC, DLDLP, TGLX, TRIGL, TRIGP, CHHD, CHHDX  No results found for: [facility]  Lab Results   Component Value Date/Time    Color YELLOW 05/27/2010 09:37 AM    Appearance CLEAR 05/27/2010 09:37 AM    Specific gravity 1.025 05/27/2010 09:37 AM    pH (UA) 6.0 05/27/2010 09:37 AM    Protein NEGATIVE  05/27/2010 09:37 AM    Glucose NEGATIVE  05/27/2010 09:37 AM    Ketone NEGATIVE  05/27/2010 09:37 AM    Bilirubin NEGATIVE  05/27/2010 09:37 AM    Urobilinogen 0.2 05/27/2010 09:37 AM    Nitrites NEGATIVE  05/27/2010 09:37 AM    Leukocyte Esterase NEGATIVE  05/27/2010 09:37 AM         Medications Reviewed:     Current Facility-Administered Medications   Medication Dose Route Frequency    haloperidol lactate (HALDOL) injection 2 mg  2 mg IntraMUSCular Q1H PRN    albumin human 25% (BUMINATE) solution 25 g  25 g IntraVENous BID    albuterol-ipratropium (DUO-NEB) 2.5 MG-0.5 MG/3 ML  3 mL Nebulization Q6H PRN    midodrine (PROAMATINE) tablet 10 mg  10 mg Oral TID WITH MEALS    dilTIAZem IR (CARDIZEM) tablet 15 mg  15 mg Oral TIDAC    metoprolol tartrate (LOPRESSOR) tablet 12.5 mg  12.5 mg Oral Q12H    dilTIAZem (CARDIZEM) 125 mg in 0.9% sodium chloride 125 mL infusion  0-15 mg/hr IntraVENous TITRATE    ceFAZolin (ANCEF) 1 g in sterile water (preservative free) 10 mL IV syringe  1 g IntraVENous Q12H    sodium chloride (NS) flush 5-40 mL  5-40 mL IntraVENous Q8H    sodium chloride (NS) flush 5-40 mL  5-40 mL IntraVENous PRN    acetaminophen (TYLENOL) tablet 650 mg  650 mg Oral Q6H PRN    Or    acetaminophen (TYLENOL) suppository 650 mg  650 mg Rectal Q6H PRN    polyethylene glycol (MIRALAX) packet 17 g  17 g Oral DAILY PRN    ondansetron (ZOFRAN ODT) tablet 4 mg  4 mg Oral Q8H PRN    Or    ondansetron (ZOFRAN) injection 4 mg  4 mg IntraVENous Q6H PRN    enoxaparin (LOVENOX) injection 30 mg  30 mg SubCUTAneous DAILY    furosemide (LASIX) injection 40 mg  40 mg IntraVENous BID    haloperidol lactate (HALDOL) injection 1 mg  1 mg IntraMUSCular Q6H PRN     ______________________________________________________________________  EXPECTED LENGTH OF STAY: - - -  ACTUAL LENGTH OF STAY:          3      Please note that this dictation was completed with [employer], the computer voice recognition software. Quite often unanticipated grammatical, syntax, homophones, and other interpretive errors are inadvertently transcribed by the computer software. Please disregard these errors. Please excuse any errors that have escaped final proofreading.                 Yaneli Castro MD

## 2022-03-27 NOTE — PROGRESS NOTES
2200: Patient keeps pulling at blancas and attempting to climb out of bed and shouting out, \"Help me\" over and over. Received an order for soft wrist restraints at this time by MD and an order for haldol PRN.    0401: Patient attempting to climb out of bed again, yelling \"This is illegal to tie my hands down, I am going to jed\" and increasingly became more agitated and refusing to get back in bed. Haldol given at this time per order in [facility]    0445: Patient calm and asleep right now. VS stable.

## 2022-03-27 NOTE — CONSULTS
Consult was placed for Dr. Yasemin Mendoza [facility]ph[facility])-> please call their group.     Mohinder Stephenson MD  [facility]

## 2022-03-27 NOTE — CONSULTS
[facility] Renal Consult Note      NAME:  Arun Woody   :   1923   MRN:  [id number]     Requesting Physician Padma Grace MD   Reason for Consult:  Acute azotemia     PCP:  Sav Lazo MD     Date/Time:  3/27/2022 1:07 PM          Subjective:     CHIEF COMPLAINT: A-fib/ fall    HISTORY OF PRESENT ILLNESS:     Mr. Carlos Sunshine is a 80 y.o.  male who is admitted to the medical Service with A-fib  We are asked to evaluate for azotemia. Admit creatinines around 1.8. Has hx of prostate cancer and was having bouts of urinary retention. Davis placed with  in urine output. Increased creatinine today may reflect obstructive uropathy. He appears mildly volume overloaded, has wheezing. Very hard of hearing and difficult to get historical or reviw of systems data. Past Medical History:   Diagnosis Date    Arthritis     OA (shoulders)    Cancer ([address]     prostate    Fracture     leg (w/ankle bracing)    Glaucoma     Hearing loss     Polymyalgia rheumatica (HCC)     Radiation proctitis     Spinal stenosis         Past Surgical History:   Procedure Laterality Date    ENDOSCOPY, COLON, DIAGNOSTIC  2005    ([address])    HX APPENDECTOMY      HX HEENT  2005    cat, ext. (right)-(Goldmann)    HX HERNIA REPAIR  2004    Right       Social History     Tobacco Use    Smoking status: Never Smoker    Smokeless tobacco: Never Used   Substance Use Topics    Alcohol use: Not on file        No family history on file. Allergies   Allergen Reactions    Digoxin Unknown (comments)        Prior to Admission medications    Medication Sig Start Date End Date Taking? Authorizing Provider   aspirin delayed-release 81 mg tablet Take 81 mg by mouth daily. Yes Provider, Historical   atorvastatin (LIPITOR) 20 mg tablet Take 20 mg by mouth daily. Yes Provider, Historical   bumetanide (BUMEX) 2 mg tablet Take 2 mg by mouth two (2) times a day.    Yes Provider, Historical   carvediloL (Coreg) 6.25 mg tablet Take 6.25 mg by mouth two (2) times daily (with meals). Yes Provider, Historical   diclofenac (VOLTAREN) 0.1 % ophthalmic solution Administer 1 Drop to left eye four (4) times daily. Yes Provider, Historical   latanoprost (XALATAN) 0.005 % ophthalmic solution Administer 1 Drop to both eyes nightly. Yes Provider, Historical   triamcinolone acetonide (KENALOG) 0.1 % topical cream Apply  to affected area two (2) times a day.  use thin layer  Back and neck rash   Yes Provider, Historical         Current Facility-Administered Medications:     guaiFENesin-dextromethorphan (ROBITUSSIN DM) 100-10 mg/5 mL syrup 5 mL, 5 mL, Oral, Q6H PRN, Joy Nolan MD    haloperidol lactate (HALDOL) injection 2 mg, 2 mg, IntraMUSCular, Q1H PRN, Nat Cope NP, 2 mg at 03/27/22 0401    albumin human 25% (BUMINATE) solution 25 g, 25 g, IntraVENous, BID, Shiela Davila MD, 25 g at 03/27/22 0921    albuterol-ipratropium (DUO-NEB) 2.5 MG-0.5 MG/3 ML, 3 mL, Nebulization, Q6H PRN, Yolis Eldridge MD, 3 mL at 03/26/22 1813    midodrine (PROAMATINE) tablet 10 mg, 10 mg, Oral, TID WITH MEALS, Shiela Davila MD, 10 mg at 03/27/22 1241    dilTIAZem IR (CARDIZEM) tablet 15 mg, 15 mg, Oral, TIDAC, Shiela Davila MD, 15 mg at 03/27/22 1244    metoprolol tartrate (LOPRESSOR) tablet 12.5 mg, 12.5 mg, Oral, Q12H, Shiela Davila MD, 12.5 mg at 03/27/22 4465    ceFAZolin (ANCEF) 1 g in sterile water (preservative free) 10 mL IV syringe, 1 g, IntraVENous, Q12H, Nicole Morrow MD, 1 g at 03/27/22 1241    sodium chloride (NS) flush 5-40 mL, 5-40 mL, IntraVENous, Q8H, Nicole Morrow MD, 10 mL at 03/27/22 0600    sodium chloride (NS) flush 5-40 mL, 5-40 mL, IntraVENous, PRN, Nicole Morrow MD    acetaminophen (TYLENOL) tablet 650 mg, 650 mg, Oral, Q6H PRN **OR** acetaminophen (TYLENOL) suppository 650 mg, 650 mg, Rectal, Q6H PRN, Nicole Morrow MD    polyethylene glycol (MIRALAX) packet 17 g, 17 g, Oral, DAILY PRN, Cherri Molina MD    ondansetron (ZOFRAN ODT) tablet 4 mg, 4 mg, Oral, Q8H PRN **OR** ondansetron (ZOFRAN) injection 4 mg, 4 mg, IntraVENous, Q6H PRN, Nicole Morrow MD    enoxaparin (LOVENOX) injection 30 mg, 30 mg, SubCUTAneous, DAILY, Og Morrow MD, 30 mg at 03/27/22 0920    [Held by provider] furosemide (LASIX) injection 40 mg, 40 mg, IntraVENous, BID, Nicole Morrow MD, 40 mg at 03/26/22 1721    haloperidol lactate (HALDOL) injection 1 mg, 1 mg, IntraMUSCular, Q6H PRN, Og Morrow MD, 1 mg at 03/26/22 1854      Review of Systems:  Review of systems not obtained due to patient factors. Objective:      VITALS:    Vital signs reviewed; most recent are:    Visit Vitals  /61 (BP 1 Location: Left upper arm, BP Patient Position: At rest)   Pulse 97   Temp 97.7 °F (36.5 °C)   Resp 14   Ht 5' 9\" (1.753 m)   Wt 93.8 kg (206 lb 11.2 oz)   SpO2 99%   BMI 30.52 kg/m²     SpO2 Readings from Last 6 Encounters:   03/27/22 99%    O2 Flow Rate (L/min): 4 l/min       Intake/Output Summary (Last 24 hours) at 3/27/2022 1307  Last data filed at 3/27/2022 1241  Gross per 24 hour   Intake 780 ml   Output 1030 ml   Net -250 ml            Exam:   Physical Exam:General appearance: alert, cooperative, no distress, appears stated age, very hard of hearing  Head: Normocephalic, without obvious abnormality, atraumatic  Eyes: negative findings: anicteric sclera and conjunctivae and sclerae normal  Neck: supple, symmetrical, trachea midline, no adenopathy, thyroid: not enlarged, symmetric, no tenderness/mass/nodules, no carotid bruit and no JVD  Lungs: scattered expiratory wheezes  Heart: regular rate and rhythm, no S3 or S4  Abdomen: soft, non-tender.  Bowel sounds normal. No masses,  no organomegaly  : blancas in place  Extremities: venous stasis dermatitis with cellulitic changes noted lower ext, edema lower extremities  Neurologic: nearly deaf, no other overt focal sensory or motor findings     LABS:  Recent Labs 03/27/22  0254 03/26/22  1230 03/25/22  0212   * 136 138   K 5.1 5.0 3.4*    104 104   CO2 25 22 27   BUN 53* 46* 39*   CREA 2.25* 1.89* 1.71*   CA 8.6 8.6 8.2*   ALB  --   --  2.6*   PHOS  --   --  3.6   MG  --   --  2.1     Recent Labs     03/25/22 0212   WBC 10.5   HGB 11.3*   HCT 35.6*        No results found for: GLUCPOC  No results for input(s): PH, PCO2, PO2, HCO3, FIO2 in the last 72 hours. No results for input(s): INR, INREXT in the last 72 hours. No results for input(s): DAYLIN, KU, CLU, CREAU in the last 72 hours. No lab exists for component: PROU     ECHO 3-26-22    Left Ventricle: Left ventricle size is normal. Mildly increased wall thickness. Severe global hypokinesis present. Severely reduced left ventricular systolic function with a visually estimated EF of 15 - 20%. Diastolic function indeterminate in the setting of atrial fibrillation.   Right Ventricle: Moderately reduced systolic function.   Aortic Valve: Moderately calcified cusp. Mild transvalvular regurgitation. Classic low flow/low gradient severe aortic stenosis with low EF. ULISES 0.73 cm2, DI 0.21.    Mitral Valve: Mild annular dilation. Moderate transvalvular regurgitation.   Tricuspid Valve: Moderately elevated RVSP. RVSP is 53 mmHg.   Left Atrium: Left atrium is mildly dilated.   Right Atrium: Right atrium is severely dilated.   Pericardium: Pleural effusion present.       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, good urine output after blancas placed may be related to acute lower ext inflammation  Hx of prostate cancer  HFrEF  Lower ext cellulitis, PVD  Atrial fib with RVR on admit  Hearing defect    Plan:     Obtain/ Order: labs/cultures/radiology/procedures:  urine lytes and urine creatinine and spot urine protein and creatinine ratio and renal panel  In AM      Therapeutic:    Can probbly restart lasix since blancas in place  Hold with renal US unless creatinine rising further---US  Would be for size and echo pattern since not currently obstructed ---- now with blancas    Risk of deterioration: low      Total time spent with patient: 30 Minutes                             ___________________________________________________    Attending Physician: Anna Alanis MD

## 2022-03-28 ENCOUNTER — APPOINTMENT (OUTPATIENT)
Dept: CT IMAGING | Age: 87
DRG: 291 | End: 2022-03-28
Attending: STUDENT IN AN ORGANIZED HEALTH CARE EDUCATION/TRAINING PROGRAM
Payer: MEDICARE

## 2022-03-28 LAB
ANION GAP SERPL CALC-SCNC: 7 MMOL/L (ref 5–15)
BUN SERPL-MCNC: 56 MG/DL (ref 6–20)
BUN/CREAT SERPL: 29 (ref 12–20)
CALCIUM SERPL-MCNC: 8.4 MG/DL (ref 8.5–10.1)
CHLORIDE SERPL-SCNC: 103 MMOL/L (ref 97–108)
CO2 SERPL-SCNC: 26 MMOL/L (ref 21–32)
CREAT SERPL-MCNC: 1.91 MG/DL (ref 0.7–1.3)
GLUCOSE BLD STRIP.AUTO-MCNC: 97 MG/DL (ref 65–117)
GLUCOSE SERPL-MCNC: 93 MG/DL (ref 65–100)
POTASSIUM SERPL-SCNC: 4.8 MMOL/L (ref 3.5–5.1)
SERVICE CMNT-IMP: NORMAL
SODIUM SERPL-SCNC: 136 MMOL/L (ref 136–145)

## 2022-03-28 PROCEDURE — 74011000250 HC RX REV CODE- 250: Performed by: INTERNAL MEDICINE

## 2022-03-28 PROCEDURE — 99223 1ST HOSP IP/OBS HIGH 75: CPT | Performed by: INTERNAL MEDICINE

## 2022-03-28 PROCEDURE — 65660000001 HC RM ICU INTERMED STEPDOWN

## 2022-03-28 PROCEDURE — 36415 COLL VENOUS BLD VENIPUNCTURE: CPT

## 2022-03-28 PROCEDURE — P9047 ALBUMIN (HUMAN), 25%, 50ML: HCPCS | Performed by: INTERNAL MEDICINE

## 2022-03-28 PROCEDURE — 71250 CT THORAX DX C-: CPT

## 2022-03-28 PROCEDURE — 77010033678 HC OXYGEN DAILY

## 2022-03-28 PROCEDURE — 80048 BASIC METABOLIC PNL TOTAL CA: CPT

## 2022-03-28 PROCEDURE — 74011250637 HC RX REV CODE- 250/637: Performed by: INTERNAL MEDICINE

## 2022-03-28 PROCEDURE — 82962 GLUCOSE BLOOD TEST: CPT

## 2022-03-28 PROCEDURE — 74011000250 HC RX REV CODE- 250: Performed by: STUDENT IN AN ORGANIZED HEALTH CARE EDUCATION/TRAINING PROGRAM

## 2022-03-28 PROCEDURE — 74011250636 HC RX REV CODE- 250/636: Performed by: HOSPITALIST

## 2022-03-28 PROCEDURE — 74011250636 HC RX REV CODE- 250/636: Performed by: INTERNAL MEDICINE

## 2022-03-28 PROCEDURE — 94640 AIRWAY INHALATION TREATMENT: CPT

## 2022-03-28 PROCEDURE — 74011000250 HC RX REV CODE- 250: Performed by: HOSPITALIST

## 2022-03-28 PROCEDURE — 51798 US URINE CAPACITY MEASURE: CPT

## 2022-03-28 RX ORDER — HALOPERIDOL 2 MG/ML
2 SOLUTION ORAL
Status: DISCONTINUED | OUTPATIENT
Start: 2022-03-28 | End: 2022-03-29 | Stop reason: HOSPADM

## 2022-03-28 RX ORDER — OXYCODONE HYDROCHLORIDE 5 MG/1
5 TABLET ORAL
Status: DISCONTINUED | OUTPATIENT
Start: 2022-03-28 | End: 2022-03-29 | Stop reason: HOSPADM

## 2022-03-28 RX ORDER — TRIAMCINOLONE ACETONIDE 1 MG/G
OINTMENT TOPICAL 2 TIMES DAILY
Status: DISCONTINUED | OUTPATIENT
Start: 2022-03-28 | End: 2022-03-29 | Stop reason: HOSPADM

## 2022-03-28 RX ORDER — LATANOPROST 50 UG/ML
1 SOLUTION/ DROPS OPHTHALMIC EVERY EVENING
Status: DISCONTINUED | OUTPATIENT
Start: 2022-03-28 | End: 2022-03-29 | Stop reason: HOSPADM

## 2022-03-28 RX ADMIN — SODIUM CHLORIDE, PRESERVATIVE FREE 10 ML: 5 INJECTION INTRAVENOUS at 23:48

## 2022-03-28 RX ADMIN — WATER 1 G: 1 INJECTION INTRAMUSCULAR; INTRAVENOUS; SUBCUTANEOUS at 12:34

## 2022-03-28 RX ADMIN — ALBUMIN (HUMAN) 25 G: 0.25 INJECTION, SOLUTION INTRAVENOUS at 18:10

## 2022-03-28 RX ADMIN — ONDANSETRON 4 MG: 2 INJECTION INTRAMUSCULAR; INTRAVENOUS at 15:03

## 2022-03-28 RX ADMIN — METOPROLOL TARTRATE 12.5 MG: 25 TABLET, FILM COATED ORAL at 09:52

## 2022-03-28 RX ADMIN — SODIUM CHLORIDE, PRESERVATIVE FREE 10 ML: 5 INJECTION INTRAVENOUS at 07:28

## 2022-03-28 RX ADMIN — DILTIAZEM HYDROCHLORIDE 15 MG: 30 TABLET, FILM COATED ORAL at 18:10

## 2022-03-28 RX ADMIN — FUROSEMIDE 40 MG: 10 INJECTION, SOLUTION INTRAMUSCULAR; INTRAVENOUS at 09:51

## 2022-03-28 RX ADMIN — IPRATROPIUM BROMIDE AND ALBUTEROL SULFATE 3 ML: .5; 3 SOLUTION RESPIRATORY (INHALATION) at 02:10

## 2022-03-28 RX ADMIN — IPRATROPIUM BROMIDE AND ALBUTEROL SULFATE 3 ML: .5; 3 SOLUTION RESPIRATORY (INHALATION) at 07:31

## 2022-03-28 RX ADMIN — MIDODRINE HYDROCHLORIDE 10 MG: 5 TABLET ORAL at 18:10

## 2022-03-28 RX ADMIN — HALOPERIDOL LACTATE 1 MG: 5 INJECTION, SOLUTION INTRAMUSCULAR at 01:25

## 2022-03-28 RX ADMIN — IPRATROPIUM BROMIDE AND ALBUTEROL SULFATE 3 ML: .5; 3 SOLUTION RESPIRATORY (INHALATION) at 14:21

## 2022-03-28 RX ADMIN — ALBUMIN (HUMAN) 25 G: 0.25 INJECTION, SOLUTION INTRAVENOUS at 09:52

## 2022-03-28 RX ADMIN — WATER 1 G: 1 INJECTION INTRAMUSCULAR; INTRAVENOUS; SUBCUTANEOUS at 00:08

## 2022-03-28 RX ADMIN — MIDODRINE HYDROCHLORIDE 10 MG: 5 TABLET ORAL at 12:29

## 2022-03-28 RX ADMIN — DILTIAZEM HYDROCHLORIDE 15 MG: 30 TABLET, FILM COATED ORAL at 07:27

## 2022-03-28 RX ADMIN — HALOPERIDOL 2 MG: 2 SOLUTION ORAL at 23:48

## 2022-03-28 RX ADMIN — HALOPERIDOL LACTATE 1 MG: 5 INJECTION, SOLUTION INTRAMUSCULAR at 07:32

## 2022-03-28 RX ADMIN — DILTIAZEM HYDROCHLORIDE 15 MG: 30 TABLET, FILM COATED ORAL at 12:29

## 2022-03-28 RX ADMIN — SODIUM CHLORIDE, PRESERVATIVE FREE 10 ML: 5 INJECTION INTRAVENOUS at 15:03

## 2022-03-28 RX ADMIN — MIDODRINE HYDROCHLORIDE 10 MG: 5 TABLET ORAL at 09:57

## 2022-03-28 RX ADMIN — ENOXAPARIN SODIUM 30 MG: 100 INJECTION SUBCUTANEOUS at 09:53

## 2022-03-28 RX ADMIN — FUROSEMIDE 40 MG: 10 INJECTION, SOLUTION INTRAMUSCULAR; INTRAVENOUS at 18:10

## 2022-03-28 NOTE — PROGRESS NOTES
Occupational Therapy  3/28/2022    Chart reviewed. Pt is off the floor for CT. Will follow. Joy Galloway, MS, OTR/L    1200 addendum  Attempted to see pt for OT. Pt discharging today on hospice. MD then in room and reported no need for therapy.  Pt also requesting to rest.

## 2022-03-28 NOTE — PROGRESS NOTES
Renal Progress Note    NAME:  Momo Bacon   :   1923   MRN:   [id number]     Date/Time:  3/28/2022 10:01 AM      Assessment:   1. Acute Kidney Injury --> likely sec to Obstruction  2. Stage 3 CKD  3. A. Fib  4. Heart Failure  5. Cellulitis       Plan:   1. His creatinine may be approaching his baseline. 2. Dose meds for his gfr.  3. Continue diuresis. 4. Avoid NSAIDs + IV contrast  5. Follow lytes. Subjective:         F/U - AKASH, CKD --> [place] --> 3/28/22      No major complaints were offered.       Review of Systems:  Y  N       Y  N  []         []          Fever/chills                                               []         []          Chest Pain  []         []          Cough                                                       []         []          Diarrhea   []         []          Sputum                                                     []         []          Constipation  []         []          SOB/MEYER                                                []         []          Nausea/Vomit  []         []          Abd Pain                                                    []         []          Tolerating PT  []         []          Dysuria                                                      []         []          Tolerating Diet     []        Unable to obtain  ROS due to  []        mental status change  []        sedated   []        intubated    Medications reviewed:  Current Facility-Administered Medications   Medication Dose Route Frequency    guaiFENesin-dextromethorphan (ROBITUSSIN DM) 100-10 mg/5 mL syrup 5 mL  5 mL Oral Q6H PRN    albuterol-ipratropium (DUO-NEB) 2.5 MG-0.5 MG/3 ML  3 mL Nebulization Q6H RT    triamcinolone acetonide (KENALOG) 0.1 % ointment   Topical BID PRN    albumin human 25% (BUMINATE) solution 25 g  25 g IntraVENous BID    midodrine (PROAMATINE) tablet 10 mg  10 mg Oral TID WITH MEALS    dilTIAZem IR (CARDIZEM) tablet 15 mg  15 mg Oral TIDAC    metoprolol tartrate (LOPRESSOR) tablet 12.5 mg  12.5 mg Oral Q12H    ceFAZolin (ANCEF) 1 g in sterile water (preservative free) 10 mL IV syringe  1 g IntraVENous Q12H    sodium chloride (NS) flush 5-40 mL  5-40 mL IntraVENous Q8H    sodium chloride (NS) flush 5-40 mL  5-40 mL IntraVENous PRN    acetaminophen (TYLENOL) tablet 650 mg  650 mg Oral Q6H PRN    Or    acetaminophen (TYLENOL) suppository 650 mg  650 mg Rectal Q6H PRN    polyethylene glycol (MIRALAX) packet 17 g  17 g Oral DAILY PRN    ondansetron (ZOFRAN ODT) tablet 4 mg  4 mg Oral Q8H PRN    Or    ondansetron (ZOFRAN) injection 4 mg  4 mg IntraVENous Q6H PRN    enoxaparin (LOVENOX) injection 30 mg  30 mg SubCUTAneous DAILY    furosemide (LASIX) injection 40 mg  40 mg IntraVENous BID    haloperidol lactate (HALDOL) injection 1 mg  1 mg IntraMUSCular Q6H PRN        Objective:   Vitals:  Visit Vitals  /80 (BP 1 Location: Right upper arm, BP Patient Position: At rest)   Pulse 95   Temp 97.8 °F (36.6 °C)   Resp 20   Ht 5' 9\" (1.753 m)   Wt 95.4 kg (210 lb 6.4 oz)   SpO2 97%   BMI 31.07 kg/m²     Temp (24hrs), Av.8 °F (36.6 °C), Min:97.7 °F (36.5 °C), Max:97.8 °F (36.6 °C)    O2 Flow Rate (L/min): 3 l/min O2 Device: Nasal cannula    Last 24hr Input/Output:    Intake/Output Summary (Last 24 hours) at 3/28/2022 1001  Last data filed at 3/28/2022 0000  Gross per 24 hour   Intake 600 ml   Output 575 ml   Net 25 ml        PHYSICAL EXAM:    Seen in Room 455. His Nurse was in attendance. General:    Chronically ill-appearing       Head:   Normocephalic    Eyes:   No icterus    Lungs:   Expiratory wheezes, no rales  . Heart:   No S 3 gallop, No pericardial rub. .  Abdomen:   Not distended     :                 Davis    Extremities: Leg oedema +    Skin:     Chronic Skin changes      Psych:   Not anxious or agitated. Neurologic: Awake and responding to questions.         []        Telemetry Reviewed     []        NSR []        PAC/PVCs []        Afib  []        Paced   []        NSVT   []        Davis []        NGT  []        Intubated on vent    Lab Data Reviewed:    Recent Results (from the past 24 hour(s))   POTASSIUM, UR, RANDOM    Collection Time: 03/27/22  4:50 PM   Result Value Ref Range    Potassium urine, random 56 MMOL/L   PROTEIN URINE, RANDOM    Collection Time: 03/27/22  4:50 PM   Result Value Ref Range    Protein, urine random 220 (H) 0.0 - 11.9 mg/dL   CREATININE, UR, RANDOM    Collection Time: 03/27/22  4:50 PM   Result Value Ref Range    Creatinine, urine 83.80 mg/dL   SODIUM, UR, RANDOM    Collection Time: 03/27/22  4:50 PM   Result Value Ref Range    Sodium,urine random 16 MMOL/L   METABOLIC PANEL, BASIC    Collection Time: 03/28/22  3:00 AM   Result Value Ref Range    Sodium 136 136 - 145 mmol/L    Potassium 4.8 3.5 - 5.1 mmol/L    Chloride 103 97 - 108 mmol/L    CO2 26 21 - 32 mmol/L    Anion gap 7 5 - 15 mmol/L    Glucose 93 65 - 100 mg/dL    BUN 56 (H) 6 - 20 MG/DL    Creatinine 1.91 (H) 0.70 - 1.30 MG/DL    BUN/Creatinine ratio 29 (H) 12 - 20      GFR est AA 40 (L) >60 ml/min/1.73m2    GFR est non-AA 33 (L) >60 ml/min/1.73m2    Calcium 8.4 (L) 8.5 - 10.1 MG/DL   GLUCOSE, POC    Collection Time: 03/28/22  6:26 AM   Result Value Ref Range    Glucose (POC) 97 65 - 117 mg/dL    Performed by Alireza Shelton        Total time spent with patient:  []        15   []        25   []        35   []         __ minutes    []        Critical Care Provided    Care Plan discussed with:    Nursing        []        Patient   []        Family    []        Care Manager   []        Consultant/Specialist :      []          >50% of visit spent in counseling and coordination of care   (Discussed []        CODE status,  []        Care Plan, []        D/C Planning)    ___________________________________________________    Attending Physician: Alexandra Calderon MD

## 2022-03-28 NOTE — CONSULTS
Palliative Medicine Consult  [place]: [phone] ([phone])    Patient Name: Jeovanny Rosa  YOB: 1923    Date of Initial Consult: 3/28/2022  Reason for Consult: care decisions  Requesting Provider: Dr. Ashlyn Elise  Primary Care Physician: Malvin Levine MD     SUMMARY:   Jeovanny Rosa is a 80 y. o. with a past history of hearing loss, vascular dementia (hx CVA May 2021) , prostate cancer s/p XRT, PMR, spinal stenosis, glaucoma, Hx GI bleeding on blood thinner,  who was admitted on 3/24/2022 from [facility] with a diagnosis of fall at home. Current medical issues leading to Palliative Medicine involvement include: AKASH on CKD3, improved urine output with blancas placement, HF w EF 15-20% by ECHO, severe global hypokinesis, afib with RVR (not a candidate for [facility] due to falls/ hx GI bleed), LE cellulitis being treated with IV Ancef. Patient has intermittent agitation, worse in evening. Chest CT/ paracentesis pending. CXR: Airspace disease in the right lower lobe with right effusion. Probable left basilar airspace disease and effusion. No definite change. Head CT: Old left occipital infarct. Generalized atrophy. No acute abnormality. Chest CT:Dextroconvex cervical curvature  No acute fracture  Moderate right C3-4 and bilateral C4-5 neural foraminal stenosis  Small to moderate right pleural transudate  Right TMJ arthritis       Patient lives at [address] [phone], cell [phone]. His son Chrissy Alberto lives in [place]  Patient's wife  in 2021. Since his strokes in May 2021 he has lost historical memories. Good at [facility], walks to meals, takes himself to bathroom, has snack fridge in his room     PALLIATIVE DIAGNOSES:   1. Agitation in hospital setting, underlying dementia  2. Patient does not demonstrate decision making capacity for complex medical decisions  3. Shortness of breath  4. HF with EF 15-20%, severe global hypokinesis.   5. 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 Hearing impaired  7. Palliative medicine encounter, care goals discussion       PLAN:   1. Patient denies distressing symptoms at this time, but says he feels \"overall physically not good\". He is not able to engage about care goals. 2. Care discussed with bedside nurse. 3. Met with patient's daughter, Whit Sorto and by phone son Vesna Poole joined in also. 1. Introduced palliative medicine services. 2. Hear of recent events since patient's spouse , he had two strokes and came to live at [facility]  3. Hear of hospital course, has been frustrating for dtr as she has not received communication from physicians over the weekend and also issues with medications prior to admission being ordered in hospital.   4. We review current medical issues including urinary obstruction, kidney acute on chronic failure, heart failure (EF now declined to 15-20%), debility, LE cellulitis. 5. We talk about care goals and family is clear goal at this time is comfort. They know their dad is miserable in hospital setting, and do not want him going through procedures. 6. Discuss option of hospice, and how that works (they are familiar, their mom had [address] at EOL), option of electing that now or in the future. 7. Family choosing to focus on comfort now, and get him back to [address] as soon as hospice can be arranged. 8. Discuss that urinary obstruction issue will need to be sorted out prior to discharge (he may need a blancas to remain in place if cannot urinate without it)  9. DDNR signed and copies given to family and placed on chart. Case discussed with attending MD, bedside nurse, case management .   Consult hospice for time of return to [address] [place] [address] / TREATMENT PREFERENCES:     GOALS OF CARE:  Patient/Health Care Proxy Stated Goals: Comfort    TREATMENT PREFERENCES:   Code Status: DNR    Patient and family's personal goals include: focus on comfort is goal    Important upcoming milestones or family events: The patient identifies the following as important for living well:       Advance Care Planning:  [] [facility] Interdisciplinary Team has updated the ACP Navigator with [address] and Patient Capacity      No flowsheet data found. Medical Interventions: Comfort measures       Other:    As far as possible, the palliative care team has discussed with patient / health care proxy about goals of care / treatment preferences for patient. HISTORY:     History obtained from: chart, daughter    CHIEF COMPLAINT: fall at [facility]    HPI/SUBJECTIVE:    The patient is:   [x] Verbal and participatory  [] Non-participatory due to:     80year old male admitted from his [facility] with a fall there. He was found to have AKASH, rapid afib     Clinical Pain Assessment (nonverbal scale for severity on nonverbal patients):              Duration: for how long has pt been experiencing pain (e.g., 2 days, 1 month, years)  Frequency: how often pain is an issue (e.g., several times per day, once every few days, constant)     FUNCTIONAL ASSESSMENT:     Palliative Performance Scale (PPS):  PPS: 30       PSYCHOSOCIAL/SPIRITUAL SCREENING:     Palliative IDT has assessed this patient for cultural preferences / practices and a referral made as appropriate to needs (Cultural Services, Patient Advocacy, Ethics, etc.)    Any spiritual / Sabianism concerns:  [] Yes /  [x] No   If \"Yes\" to discuss with pastoral care during IDT     Does caregiver feel burdened by caring for their loved one:   [] Yes /  [x] No /  [] No Caregiver Present    If \"Yes\" to discuss with social work during IDT    Anticipatory grief assessment:   [x] Normal  / [] Maladaptive     If \"Maladaptive\" to discuss with social work during IDT    ESAS Anxiety:      ESAS Depression:          REVIEW OF SYSTEMS:     Positive and pertinent negative findings in ROS are noted above in HPI.   The following systems were [x] reviewed / [] unable to be reviewed as noted in HPI  Other findings are noted below. Systems: constitutional, ears/nose/mouth/throat, respiratory, gastrointestinal, genitourinary, musculoskeletal, integumentary, neurologic, psychiatric, endocrine. Positive findings noted below. Modified ESAS Completed by: provider   Fatigue: 5                   Dyspnea: 5           Stool Occurrence(s): 2        PHYSICAL EXAM:     From RN flowsheet:  Wt Readings from Last 3 Encounters:   03/28/22 95.4 kg (210 lb 6.4 oz)   12/07/12 90.7 kg (200 lb)   08/09/12 91.6 kg (202 lb)     Blood pressure 107/68, pulse (!) 115, temperature 97.8 °F (36.6 °C), resp. rate 20, height 5' 9\" (1.753 m), weight 95.4 kg (210 lb 6.4 oz), SpO2 97 %. Pain Scale 1: Numeric (0 - 10)  Pain Intensity 1: 0                 Last bowel movement, if known:     Constitutional:  Elderly male, hard of hearing NAD  Engages in conversation  Weak cough  irreg s1s2  Abd distended, soft, +BS  LE 1+ edema bilaterally, erthema  Diminished insight       HISTORY:     Active Problems:    Hypoxemia (3/24/2022)      A-fib ([facility] (3/24/2022)      Past Medical History:   Diagnosis Date    Arthritis     OA (shoulders)    Cancer ([facility] 2002    prostate    Fracture 2004    leg (w/ankle bracing)    Glaucoma     Hearing loss     Polymyalgia rheumatica (HCC)     Radiation proctitis     Spinal stenosis       Past Surgical History:   Procedure Laterality Date    ENDOSCOPY, COLON, DIAGNOSTIC  7/2005    ([facility])    HX APPENDECTOMY  1995    HX HEENT  5/2005    cat, ext. (right)-(Goldmann)    HX HERNIA REPAIR  6/2004    Right      No family history on file. History reviewed, no pertinent family history.   Social History     Tobacco Use    Smoking status: Never Smoker    Smokeless tobacco: Never Used   Substance Use Topics    Alcohol use: Not on file     Allergies   Allergen Reactions    Digoxin Unknown (comments)      Current Facility-Administered Medications   Medication Dose Route Frequency    triamcinolone acetonide (KENALOG) 0.1 % ointment   Topical BID    guaiFENesin-dextromethorphan (ROBITUSSIN DM) 100-10 mg/5 mL syrup 5 mL  5 mL Oral Q6H PRN    albuterol-ipratropium (DUO-NEB) 2.5 MG-0.5 MG/3 ML  3 mL Nebulization Q6H RT    albumin human 25% (BUMINATE) solution 25 g  25 g IntraVENous BID    midodrine (PROAMATINE) tablet 10 mg  10 mg Oral TID WITH MEALS    dilTIAZem IR (CARDIZEM) tablet 15 mg  15 mg Oral TIDAC    metoprolol tartrate (LOPRESSOR) tablet 12.5 mg  12.5 mg Oral Q12H    ceFAZolin (ANCEF) 1 g in sterile water (preservative free) 10 mL IV syringe  1 g IntraVENous Q12H    sodium chloride (NS) flush 5-40 mL  5-40 mL IntraVENous Q8H    sodium chloride (NS) flush 5-40 mL  5-40 mL IntraVENous PRN    acetaminophen (TYLENOL) tablet 650 mg  650 mg Oral Q6H PRN    Or    acetaminophen (TYLENOL) suppository 650 mg  650 mg Rectal Q6H PRN    polyethylene glycol (MIRALAX) packet 17 g  17 g Oral DAILY PRN    ondansetron (ZOFRAN ODT) tablet 4 mg  4 mg Oral Q8H PRN    Or    ondansetron (ZOFRAN) injection 4 mg  4 mg IntraVENous Q6H PRN    enoxaparin (LOVENOX) injection 30 mg  30 mg SubCUTAneous DAILY    furosemide (LASIX) injection 40 mg  40 mg IntraVENous BID    haloperidol lactate (HALDOL) injection 1 mg  1 mg IntraMUSCular Q6H PRN          LAB AND IMAGING FINDINGS:     Lab Results   Component Value Date/Time    WBC 10.5 03/25/2022 02:12 AM    HGB 11.3 (L) 03/25/2022 02:12 AM    PLATELET 539 24/73/7594 02:12 AM     Lab Results   Component Value Date/Time    Sodium 136 03/28/2022 03:00 AM    Potassium 4.8 03/28/2022 03:00 AM    Chloride 103 03/28/2022 03:00 AM    CO2 26 03/28/2022 03:00 AM    BUN 56 (H) 03/28/2022 03:00 AM    Creatinine 1.91 (H) 03/28/2022 03:00 AM    Calcium 8.4 (L) 03/28/2022 03:00 AM    Magnesium 2.1 03/25/2022 02:12 AM    Phosphorus 3.6 03/25/2022 02:12 AM      Lab Results   Component Value Date/Time    Alk.  phosphatase 64 03/25/2022 02:12 AM Protein, total 5.5 (L) 03/25/2022 02:12 AM    Albumin 2.6 (L) 03/25/2022 02:12 AM    Globulin 2.9 03/25/2022 02:12 AM     No results found for: INR, PTMR, PTP, PT1, PT2, APTT, INREXT, INREXT   No results found for: IRON, FE, TIBC, IBCT, PSAT, FERR   No results found for: PH, PCO2, PO2  No components found for: GLPOC   No results found for: CPK, CKMB             Total time: 75min  Counseling / coordination time, spent as noted above: 45  > 50% counseling / coordination?: yes    Prolonged service was provided for  []30 min   []75 min in face to face time in the presence of the patient, spent as noted above. Time Start:   Time End:   Note: this can only be billed with 88647 (initial) or 46190 (follow up). If multiple start / stop times, list each separately.

## 2022-03-28 NOTE — PROGRESS NOTES
Transitions of Care Plan   RUR: 14% - moderate   Clinical Update: hospice at [address]: Palliative; Nephrology; Therapy   Baseline: memory care at [address] [place] [address] Barriers to Discharge: hospice set up; family has questions re urinary issues and oxygen need    Disposition: home hospice - [facility] [address]   Estimated Discharge Date: 3/29/22    CM spoke with Palliative Medicine - daughter has decided on home hospice and would prefer [facility] if possible. CM called [facility] and spoke with DON - facility only has hospice contract with [facility]. CM spoke with patient's daughter re hospice and set up through [facility]. Daughter is agreeable to home hospice after patient's urinary \"issues\" and oxygen need is worked out. Daughter requests to speak with a provider on both inquiries. CM sent home hospice referral and order to [facility] for review. CM will continue to follow.     Kimberly Mixon, MPH  Care Manager l [address]  Available via [address] [place] [address] or

## 2022-03-28 NOTE — PROGRESS NOTES
Cardiology Progress Note  3/28/2022     Admit Date: 3/24/2022  Admit Diagnosis: A-fib ([facility] [I48.91]  Hypoxemia [R09.02]  CC: none currently    Assessment/Plan:   Events noted. He has had a modest diuresis since admission. Clinically he has near end stage HF. Agree with plans for Hospice. Will sign off. Call if needed. Thanks        For other plans, see orders. Subjective: Francesca Tucker he is [facility] and has dementia. No purposeful ROV. Objective:    Physical Exam:  Overall VSSAF;    Visit Vitals  BP (!) 111/56 (BP 1 Location: Right arm, BP Patient Position: At rest)   Pulse 96   Temp 98.6 °F (37 °C)   Resp 18   Ht 5' 9\" (1.753 m)   Wt 95.4 kg (210 lb 6.4 oz)   SpO2 93%   BMI 31.07 kg/m²     Temp (24hrs), Av °F (36.7 °C), Min:97.8 °F (36.6 °C), Max:98.6 °F (37 °C)    Patient Vitals for the past 8 hrs:   Pulse   22 1800 96   22 1508 (!) 108   22 1400 93   22 1200 92    Patient Vitals for the past 8 hrs:   Resp   22 1508 18    Patient Vitals for the past 8 hrs:   BP   22 1508 (!) 111/56   22 1229 110/72          Intake/Output Summary (Last 24 hours) at 3/28/2022 1914  Last data filed at 3/28/2022 1509  Gross per 24 hour   Intake 780 ml   Output 1825 ml   Net -1045 ml       General Appearance: Well developed, well nourished, no acute distress. Ears/Nose/Mouth/Throat:   Normal MM; anicteric. JVP: WNL   Resp:   Lungs clear to auscultation bilaterally. Nl resp effort. Cardiovascular:  RRR, S1, S2 normal, no new murmur. No gallop or rub. Abdomen:   Soft, non-tender, bowel sounds are present. Extremities: Massive edema involving his lower legs and arms. Skin:  Neuro: Warm and dry. A/O x3, grossly nonfocal    Data Review:     Telemetry independently reviewed : []  sinus      [x]  chronic afib     []  par afib    []  NSVT      Lab results reviewed as noted below. Current medications reviewed as noted below.     No results for input(s): PH, PCO2, PO2 in the last 72 hours. No results for input(s): CPK, CKMB, TROIQ in the last 72 hours. Recent Labs     03/28/22  0300 03/27/22  0254 03/26/22  1230    133* 136   K 4.8 5.1 5.0    101 104   CO2 26 25 22   BUN 56* 53* 46*   CREA 1.91* 2.25* 1.89*   GLU 93 96 110*   CA 8.4* 8.6 8.6     No results for input(s): ALT, AP, TBIL, TBILI, TP, ALB, GLOB, GGT, AML, LPSE in the last 72 hours. No lab exists for component: SGOT, GPT, AMYP, HLPSE  No results for input(s): INR, PTP, APTT, INREXT in the last 72 hours. No results for input(s): FE, TIBC, PSAT, FERR in the last 72 hours.    Lab Results   Component Value Date/Time    Glucose (POC) 97 03/28/2022 06:26 AM       Current Facility-Administered Medications   Medication Dose Route Frequency    triamcinolone acetonide (KENALOG) 0.1 % ointment   Topical BID    haloperidol (HALDOL) 2 mg/mL oral solution 2 mg  2 mg Oral Q6H PRN    oxyCODONE IR (ROXICODONE) tablet 5 mg  5 mg Oral Q4H PRN    latanoprost (XALATAN) 0.005 % ophthalmic solution 1 Drop  1 Drop Both Eyes QPM    guaiFENesin-dextromethorphan (ROBITUSSIN DM) 100-10 mg/5 mL syrup 5 mL  5 mL Oral Q6H PRN    albuterol-ipratropium (DUO-NEB) 2.5 MG-0.5 MG/3 ML  3 mL Nebulization Q6H RT    albumin human 25% (BUMINATE) solution 25 g  25 g IntraVENous BID    midodrine (PROAMATINE) tablet 10 mg  10 mg Oral TID WITH MEALS    dilTIAZem IR (CARDIZEM) tablet 15 mg  15 mg Oral TIDAC    metoprolol tartrate (LOPRESSOR) tablet 12.5 mg  12.5 mg Oral Q12H    ceFAZolin (ANCEF) 1 g in sterile water (preservative free) 10 mL IV syringe  1 g IntraVENous Q12H    sodium chloride (NS) flush 5-40 mL  5-40 mL IntraVENous Q8H    sodium chloride (NS) flush 5-40 mL  5-40 mL IntraVENous PRN    acetaminophen (TYLENOL) tablet 650 mg  650 mg Oral Q6H PRN    Or    acetaminophen (TYLENOL) suppository 650 mg  650 mg Rectal Q6H PRN    polyethylene glycol (MIRALAX) packet 17 g  17 g Oral DAILY PRN    ondansetron (ZOFRAN ODT) tablet 4 mg  4 mg Oral Q8H PRN    Or    ondansetron (ZOFRAN) injection 4 mg  4 mg IntraVENous Q6H PRN    furosemide (LASIX) injection 40 mg  40 mg IntraVENous BID        Deanne Esteves MD

## 2022-03-28 NOTE — PROGRESS NOTES
[phone] (COPE)  Charmayne Forget [phone] (COPE)      GOALS OF CARE: DNR, DDNR signed, goal is to discharge back to [facility] with hospice services once urinary issues are addressed        TREATMENT PREFERENCES:   Code Status: DNR    Advance Care Planning:  [x] [facility] Interdisciplinary Team has updated the ACP Navigator with [address] and Patient Capacity    Primary Decision Maker ([address]):   Relationship to patient:  [] Named in a scanned document   [x] Legal Next of Kin  [] Guardian     Family shares that the patient does have AMD but not on file, patient - meeting with both children Lilliana Islas and Jatinder Camacho today       Family Meeting Documentation    Participants: Jatinder Camacho (daughter), Alena Paz (son), Dr. Shelly Hogue     Discussion: The Palliative Medicine SW and Dr. Shelly Hogue met with the patient at bedside, he is hard of hearing, endorses feeling physically rough but unable to elaborate further, needs assistance with complex medical decision making. We meet with the patient's daughter Jatinder Camacho, along with the patient's son Alena Braxton. Via phone (he lives in [place]), in order to introduce the role of Palliative Medicine and further discuss patient's care goals. Jatinder Camacho and Alena Braxton. Provide insight, patient has history of CVA in two places, prior to this CVA he was living at home independently- has been at [address] in [facility] for approximately 10 months.  The patient's family shares that the CVA had great impact on his memory- and that he does not have long-term memory at this point, he can recognize his son/daughter, however, does not recall the home he lived in independently, their mother, etc.     The family verbalized their frustrations with the patient's care thus far, specifically communication- Tobias Doug shares she has not heard from a provider since Wednesday, we offer assistance with Patient Advocacy, we also communicated with the attending MD.     Palliative Medicine provided update on the patient's medical condition, low EF, kidney function, etc.- the patient's children have great insight into the patient's care, and they recognize that these issues are not curable/fixable- that coming to and from hospital to [facility] is a band aid essentially on a problem, and they share that hospitalizations/invasive testing is burdensome to the patient. We talk about hospice care and that patient would qualify based on his medical condition and low EF- we provided education that hospice would focus on his comfort, helping him live each day to the best that it can be. Kimi Gaxiola Shares that he had actually been considering this option prior to the patient's admission, both Kimi Gaxiola And Lore Deal agree that the patient does not like being in the hospital, interventions cannot \"fix\" their father's medical condition, and even if they could, he would not be cooperative with them. Goal is for discharge back to [address] with hospice services. Family does have experience with hospice in the past from their mother who was on [facility]. The patient's family would like urinary issues to be addressed prior to d/c, but plan would be for d/c back to [facility] with Hospice services and focus on comfort. SW communicated plan with . Outcome / Plan: DNR, DDNR signed by patient's daughter (son also via phone), plan to discharge to [facility] with hospice services, would like urinary issues addressed prior to discharge.        Thank you for including Palliative Medicine in the care of [place], [place]  [phone]        Migue Hdez LCSW

## 2022-03-28 NOTE — PROGRESS NOTES
[facility] Adult  Hospitalist Group                                                                                          Hospitalist Progress Note  Veronica Montelongo MD  Answering service: [phone] from in house phone        Date of Service:  3/28/2022  NAME:  Tanya Roach  :  1923  MRN:  [id number]      Admission Summary:   Tanya Roach is a 80 y.o. male who presents with fall and tachycardia  Pt lives at [facility], pt is very hard of hearing. History mainly from his daughter. Mr. Rena Pryor is a 79yo male who presents to the short pump with complaints of a fall.  Per EMS, the patient was found on the ground. Estefani Werner is unclear how he fell. Mari Matute was noted to be hypoxic with an oxygen saturation in the 70s, per EMS. Bisiaida Duron put him on nasal cannula and his sats improved.  No other complaints reported. Noticed rapid afib in ER. So cardizem drip started. Interval history / Subjective:        Patient is seen and examined at bedside this AM. He just had CT chest. He is alert but not oriented - appears at baseline mentation. No family present. Discussed with nursing and palliative Dr. Nicole Maynard - family has decided on hospice on discharge. Spoke with daughter at bedside and son on phone later in the afternoon - updated patient's status - CHF with EF 15%, Pleural effusions, blancas for obstructive uropathy, cr mildly improved. alyssa Simon had many questions- answered to my best ability. They were upset regarding no update from physician in last few days - tried to reassure them. They agreed on hospice at memory care unit. Explained the need for oxygen and blancas - they agreed. Assessment & Plan:       Acute on Chronic CHFrEF  -ECHO this admission Severe global hypokinesis present. Severely reduced left ventricular systolic function with a visually estimated EF of 15 - 20%.    -Continue bp support with IV albumin and midodrine  - appreciate nephro and palliative care input  - c/w lasix     Acute Hypoxic Resp Failure - not improving   -Likely 2/2 CHF  -Procal is borderline  - CT chest: Right greater left pleural effusions with overlying atelectasis which could obscure underlying infiltrate with no identified pneumonic infiltrate otherwise  - saturating on 2-3l NC    AKASH on CKD 3 - cr improving   - appreciate Nephro input  - blancas placed  - monitor renal function     Afib  -not a AC candidate due to fall and history of GIB. -Continue asa  -D/C cardizem gtt. On po cardizem and metoprolol   - Cardiology on board     Bilateral leg cellulitis  - c/w ancef (dose adjusted), wound care     Debility and failure to thrive  - PT/OT      Code status: DNR  DVT prophylaxis: Lovenox    Care Plan discussed with: Patient/Family and Nurse  Anticipated Disposition: [facility] with hospice   Anticipated Discharge: tomorrow      Hospital Problems  Date Reviewed: 3/24/2022          Codes Class Noted POA    Hypoxemia ICD-10-CM: R09.02  ICD-9-CM: 799.02  3/24/2022 Unknown        A-fib [facility]) ICD-10-CM: I48.91  ICD-9-CM: 427.31  3/24/2022 Unknown                Review of Systems:   Review of systems not obtained due to patient factors. Vital Signs:    Last 24hrs VS reviewed since prior progress note.  Most recent are:  Visit Vitals  BP (!) 111/56 (BP 1 Location: Right arm, BP Patient Position: At rest)   Pulse (!) 108   Temp 98.6 °F (37 °C)   Resp 18   Ht 5' 9\" (1.753 m)   Wt 95.4 kg (210 lb 6.4 oz)   SpO2 93%   BMI 31.07 kg/m²         Intake/Output Summary (Last 24 hours) at 3/28/2022 1645  Last data filed at 3/28/2022 1509  Gross per 24 hour   Intake 780 ml   Output 1825 ml   Net -1045 ml        Physical Examination:     I had a face to face encounter with this patient and independently examined them on 3/28/2022 as outlined below:          General : no acute distress, resting in bed, elderly   HEENT: PEERL, EOMI, moist mucus membrane  Neck: supple, no JVD, no meningeal signs  Chest: Anterior breath sounds clear to auscultation bilaterally   CVS: S1 S2 heard, Capillary refill less than 2 seconds  Abd: soft/ Non tender, non distended, BS physiological,   Ext: no clubbing, no cyanosis, 1+ edema to level of upper thighs, brisk 2+ DP pulses  Neuro/Psych: pleasant mood and affect  Skin: warm     Data Review:    I personally reviewed  Image and labs      Labs:     No results for input(s): WBC, HGB, HCT, PLT, HGBEXT, HCTEXT, PLTEXT, HGBEXT, HCTEXT, PLTEXT in the last 72 hours. Recent Labs     03/28/22  0300 03/27/22  0254 03/26/22  1230    133* 136   K 4.8 5.1 5.0    101 104   CO2 26 25 22   BUN 56* 53* 46*   CREA 1.91* 2.25* 1.89*   GLU 93 96 110*   CA 8.4* 8.6 8.6     No results for input(s): ALT, AP, TBIL, TBILI, TP, ALB, GLOB, GGT, AML, LPSE in the last 72 hours. No lab exists for component: SGOT, GPT, AMYP, HLPSE  No results for input(s): INR, PTP, APTT, INREXT, INREXT in the last 72 hours. No results for input(s): FE, TIBC, PSAT, FERR in the last 72 hours. No results found for: FOL, RBCF   No results for input(s): PH, PCO2, PO2 in the last 72 hours. No results for input(s): CPK, CKNDX, TROIQ in the last 72 hours.     No lab exists for component: CPKMB  No results found for: CHOL, CHOLX, CHLST, CHOLV, HDL, HDLP, LDL, LDLC, DLDLP, TGLX, TRIGL, TRIGP, CHHD, CHHDX  Lab Results   Component Value Date/Time    Glucose (POC) 97 03/28/2022 06:26 AM     Lab Results   Component Value Date/Time    Color YELLOW 05/27/2010 09:37 AM    Appearance CLEAR 05/27/2010 09:37 AM    Specific gravity 1.025 05/27/2010 09:37 AM    pH (UA) 6.0 05/27/2010 09:37 AM    Protein NEGATIVE  05/27/2010 09:37 AM    Glucose NEGATIVE  05/27/2010 09:37 AM    Ketone NEGATIVE  05/27/2010 09:37 AM    Bilirubin NEGATIVE  05/27/2010 09:37 AM    Urobilinogen 0.2 05/27/2010 09:37 AM    Nitrites NEGATIVE  05/27/2010 09:37 AM    Leukocyte Esterase NEGATIVE  05/27/2010 09:37 AM         Medications Reviewed:     Current Facility-Administered Medications   Medication Dose Route Frequency    triamcinolone acetonide (KENALOG) 0.1 % ointment   Topical BID    haloperidol (HALDOL) 2 mg/mL oral solution 2 mg  2 mg Oral Q6H PRN    oxyCODONE IR (ROXICODONE) tablet 5 mg  5 mg Oral Q4H PRN    latanoprost (XALATAN) 0.005 % ophthalmic solution 1 Drop  1 Drop Both Eyes QPM    guaiFENesin-dextromethorphan (ROBITUSSIN DM) 100-10 mg/5 mL syrup 5 mL  5 mL Oral Q6H PRN    albuterol-ipratropium (DUO-NEB) 2.5 MG-0.5 MG/3 ML  3 mL Nebulization Q6H RT    albumin human 25% (BUMINATE) solution 25 g  25 g IntraVENous BID    midodrine (PROAMATINE) tablet 10 mg  10 mg Oral TID WITH MEALS    dilTIAZem IR (CARDIZEM) tablet 15 mg  15 mg Oral TIDAC    metoprolol tartrate (LOPRESSOR) tablet 12.5 mg  12.5 mg Oral Q12H    ceFAZolin (ANCEF) 1 g in sterile water (preservative free) 10 mL IV syringe  1 g IntraVENous Q12H    sodium chloride (NS) flush 5-40 mL  5-40 mL IntraVENous Q8H    sodium chloride (NS) flush 5-40 mL  5-40 mL IntraVENous PRN    acetaminophen (TYLENOL) tablet 650 mg  650 mg Oral Q6H PRN    Or    acetaminophen (TYLENOL) suppository 650 mg  650 mg Rectal Q6H PRN    polyethylene glycol (MIRALAX) packet 17 g  17 g Oral DAILY PRN    ondansetron (ZOFRAN ODT) tablet 4 mg  4 mg Oral Q8H PRN    Or    ondansetron (ZOFRAN) injection 4 mg  4 mg IntraVENous Q6H PRN    furosemide (LASIX) injection 40 mg  40 mg IntraVENous BID     ______________________________________________________________________  EXPECTED LENGTH OF STAY: 3d 19h  ACTUAL LENGTH OF STAY:          4      Please note that this dictation was completed with [employer], the computer voice recognition software. Quite often unanticipated grammatical, syntax, homophones, and other interpretive errors are inadvertently transcribed by the computer software. Please disregard these errors. Please excuse any errors that have escaped final proofreading.                 Eron Maxwell MD

## 2022-03-28 NOTE — PROGRESS NOTES
Physical Therapy    Attempted to see pt for PT. Per RN, Pt discharging today on hospice. MD then in room and reported no need for therapy. Pt also requesting to rest.       Noted new order for comfort measures only. Will complete orders at this time.

## 2022-03-28 NOTE — PROGRESS NOTES
Spiritual Care Assessment/Progress Note  [facility]      NAME: Glen Montoya      MRN: [id number]  AGE: 80 y.o. SEX: male  Sabianist Affiliation: [place]   Language: English     3/28/2022     Total Time (in minutes): 5     Spiritual Assessment begun in [facility] CV SERVICES UNIT through conversation with:         []Patient        [] Family    [] Friend(s)        Reason for Consult: Palliative Care, Initial/Spiritual Assessment     Spiritual beliefs: (Please include comment if needed)     [] Identifies with a radames tradition:         [] Supported by a radames community:            [] Claims no spiritual orientation:           [] Seeking spiritual identity:                [] Adheres to an individual form of spirituality:           [x] Not able to assess:                           Identified resources for coping:      [] Prayer                               [] Music                  [] Guided Imagery     [] Family/friends                 [] Pet visits     [] Devotional reading                         [x] Unknown     [] Other:                                              Interventions offered during this visit: (See comments for more details)                Plan of Care:     [] Support spiritual and/or cultural needs    [] Support AMD and/or advance care planning process      [] Support grieving process   [] Coordinate Rites and/or Rituals    [] Coordination with community clergy   [] No spiritual needs identified at this time   [] Detailed Plan of Care below (See Comments)  [] Make referral to Music Therapy  [] Make referral to Pet Therapy     [] Make referral to Addiction services  [] Make referral to [facility]  [] Make referral to Spiritual Care Partner  [] No future visits requested        [x] Contact Spiritual Care for further referrals     Comments: Attempted Initial Spiritual Assessment for this pt in [facility]. Reviewed pt's chart prior to this visit.   Pt was receiving bedside RN care and therefore was unable to be assessed at this time. No family/friends present at time of visit. Contact Spiritual Care Services for any spiritual or emotional support needs. Lsahawn Howell MDiv.  Staff   Request  Support/Spiritual Care Services on [phone])

## 2022-03-29 VITALS
HEIGHT: 69 IN | DIASTOLIC BLOOD PRESSURE: 94 MMHG | BODY MASS INDEX: 31.39 KG/M2 | RESPIRATION RATE: 19 BRPM | WEIGHT: 211.9 LBS | HEART RATE: 105 BPM | SYSTOLIC BLOOD PRESSURE: 122 MMHG | TEMPERATURE: 97.5 F | OXYGEN SATURATION: 100 %

## 2022-03-29 LAB
BACTERIA SPEC CULT: NORMAL
SERVICE CMNT-IMP: NORMAL

## 2022-03-29 PROCEDURE — 74011250637 HC RX REV CODE- 250/637: Performed by: INTERNAL MEDICINE

## 2022-03-29 PROCEDURE — 74011000250 HC RX REV CODE- 250: Performed by: STUDENT IN AN ORGANIZED HEALTH CARE EDUCATION/TRAINING PROGRAM

## 2022-03-29 PROCEDURE — 74011250636 HC RX REV CODE- 250/636: Performed by: HOSPITALIST

## 2022-03-29 PROCEDURE — 77010033678 HC OXYGEN DAILY

## 2022-03-29 PROCEDURE — 74011000250 HC RX REV CODE- 250: Performed by: HOSPITALIST

## 2022-03-29 PROCEDURE — 94640 AIRWAY INHALATION TREATMENT: CPT

## 2022-03-29 RX ORDER — METOPROLOL TARTRATE 25 MG/1
12.5 TABLET, FILM COATED ORAL EVERY 12 HOURS
Qty: 60 TABLET | Refills: 0 | Status: SHIPPED | OUTPATIENT
Start: 2022-03-29

## 2022-03-29 RX ORDER — IPRATROPIUM BROMIDE AND ALBUTEROL SULFATE 2.5; .5 MG/3ML; MG/3ML
3 SOLUTION RESPIRATORY (INHALATION)
Status: DISCONTINUED | OUTPATIENT
Start: 2022-03-29 | End: 2022-03-29 | Stop reason: HOSPADM

## 2022-03-29 RX ORDER — FUROSEMIDE 40 MG/1
40 TABLET ORAL DAILY
Qty: 30 TABLET | Refills: 0 | Status: SHIPPED
Start: 2022-03-29

## 2022-03-29 RX ORDER — MIDODRINE HYDROCHLORIDE 10 MG/1
10 TABLET ORAL
Qty: 90 TABLET | Refills: 0 | Status: SHIPPED | OUTPATIENT
Start: 2022-03-29 | End: 2022-04-28

## 2022-03-29 RX ORDER — DILTIAZEM HYDROCHLORIDE 30 MG/1
15 TABLET, FILM COATED ORAL
Qty: 90 TABLET | Refills: 0 | Status: SHIPPED | OUTPATIENT
Start: 2022-03-29

## 2022-03-29 RX ADMIN — METOPROLOL TARTRATE 12.5 MG: 25 TABLET, FILM COATED ORAL at 09:52

## 2022-03-29 RX ADMIN — IPRATROPIUM BROMIDE AND ALBUTEROL SULFATE 3 ML: .5; 3 SOLUTION RESPIRATORY (INHALATION) at 08:39

## 2022-03-29 RX ADMIN — HALOPERIDOL 2 MG: 2 SOLUTION ORAL at 09:52

## 2022-03-29 RX ADMIN — MIDODRINE HYDROCHLORIDE 10 MG: 5 TABLET ORAL at 09:52

## 2022-03-29 RX ADMIN — TRIAMCINOLONE ACETONIDE: 1 OINTMENT TOPICAL at 09:00

## 2022-03-29 RX ADMIN — DILTIAZEM HYDROCHLORIDE 15 MG: 30 TABLET, FILM COATED ORAL at 06:56

## 2022-03-29 RX ADMIN — SODIUM CHLORIDE, PRESERVATIVE FREE 10 ML: 5 INJECTION INTRAVENOUS at 06:03

## 2022-03-29 RX ADMIN — FUROSEMIDE 40 MG: 10 INJECTION, SOLUTION INTRAMUSCULAR; INTRAVENOUS at 09:52

## 2022-03-29 RX ADMIN — WATER 1 G: 1 INJECTION INTRAMUSCULAR; INTRAVENOUS; SUBCUTANEOUS at 01:29

## 2022-03-29 NOTE — DISCHARGE SUMMARY
Discharge Summary     Patient:  Everette Lindsay       MRN: [id number]       YOB: 1923       Age: 80 y.o. Date of admission:  3/24/2022    Date of discharge:  3/29/2022    Primary care provider: Dr. Chasity Bauer MD    Admitting provider:  Nicky Hanson MD    Discharging provider:  Jessica Quiñonez [address].: [phone]. If unavailable, call [phone] and ask the  to page the triage hospitalist.    Consultations  · IP CONSULT TO CARDIOLOGY  · IP CONSULT TO PALLIATIVE CARE - PROVIDER  · IP CONSULT TO NEPHROLOGY  · IP CONSULT TO NEPHROLOGY    Procedures  · * No surgery found *    · Discharge destination: [facility] with hospice. The patient is stable for discharge. Admission diagnosis  · A-fib ([address] [I48.91]  · Hypoxemia [R09.02]    Discharge Medication List as of 3/29/2022 10:32 AM      START taking these medications    Details   dilTIAZem IR (CARDIZEM) 30 mg tablet Take 0.5 Tablets by mouth Before breakfast, lunch, and dinner., Print, Disp-90 Tablet, R-0      metoprolol tartrate (LOPRESSOR) 25 mg tablet Take 0.5 Tablets by mouth every twelve (12) hours. , Print, Disp-60 Tablet, R-0      midodrine (PROAMATINE) 10 mg tablet Take 1 Tablet by mouth three (3) times daily (with meals) for 30 days. , Print, Disp-90 Tablet, R-0         CONTINUE these medications which have NOT CHANGED    Details   aspirin delayed-release 81 mg tablet Take 81 mg by mouth daily. , Historical Med      atorvastatin (LIPITOR) 20 mg tablet Take 20 mg by mouth daily. , Historical Med      diclofenac (VOLTAREN) 0.1 % ophthalmic solution Administer 1 Drop to left eye four (4) times daily. , Historical Med      latanoprost (XALATAN) 0.005 % ophthalmic solution Administer 1 Drop to both eyes nightly., Historical Med      triamcinolone acetonide (KENALOG) 0.1 % topical cream Apply  to affected area two (2) times a day. use thin layer  Back and neck rash, Historical Med         STOP taking these medications       bumetanide (BUMEX) 2 mg tablet Comments:   Reason for Stopping:         carvediloL (Coreg) 6.25 mg tablet Comments:   Reason for Stopping: Follow-up Information     Follow up With Specialties Details Why Contact Info    Zachariah Melendez MD Internal Medicine, Bariatrics On 4/6/2022 Hospital follow up new primary care appointment Wednesday, 4/6/22 at 2:00 p.m. Please arrive 15 minutes early with photo ID, insurance card and a listing of all medications. [address] [facility] [id number] [phone]      Jacques Pepe MD Family Medicine             Final discharge diagnoses and brief hospital course    Gretta Chappell a 80 y. o. male who presents with fall and tachycardia  Pt lives at [facility], pt is very hard of hearing. History mainly from his daughter.   Mr. Moe Martinez is a 81yo male who presents to the short pumpER with complaints of a fall.  Per EMS, the patient was found on the ground. Alejandra Adryan is unclear how he fell. Aleksandra Seay was noted to be hypoxic with an oxygen saturation in the 70s, per EMS. Aelja Picking put him on nasal cannula and his sats improved.  No other complaints reported. Noticed rapid afib in ER. So cardizem drip started.     Acute on Chronic CHFrEF  -ECHO this admission Severe global hypokinesis present. Severely reduced left ventricular systolic function with a visually estimated EF of 15 - 20%.    -Continue BP support with IV albumin and midodrine  - appreciate nephro and palliative care input  - c/w lasix changed to po lasix 40mg discharge      Acute Hypoxic Resp Failure - not improving   -Likely 2/2 CHF  -Procal is borderline  - CT chest: Right greater left pleural effusions with overlying atelectasis which could obscure underlying infiltrate with no identified pneumonic infiltrate otherwise  - saturating on 2-3l NC     AKASH on CKD 3 - cr improving   - appreciate Nephro input  - blancas placed  - monitor renal function      Afib  -not a AC candidate due to fall and history of GIB. -Continue asa  -D/C cardizem gtt. On po cardizem and metoprolol   - appreciate Cardiology input      Bilateral leg cellulitis  - c/w ancef (dose adjusted), wound care      Debility and failure to thrive  - PT/OT     Dementia  Hx CVA  - supportive care    Palliative care on board. Family decided on hospice on discharged    Patient discharged back to memory care unit with hospice     Physical examination at discharge  Visit Vitals  /69   Pulse (!) 116   Temp 97.7 °F (36.5 °C)   Resp 19   Ht 5' 9\" (1.753 m)   Wt 96.1 kg (211 lb 14.4 oz)   SpO2 100%   BMI 31.29 kg/m²     General : no acute distress, resting in bed, elderly   HEENT: PEERL, EOMI, moist mucus membrane  Neck: supple, no JVD, no meningeal signs  Chest: Anterior breath sounds clear to auscultation bilaterally   CVS: S1 S2 heard, Capillary refill less than 2 seconds  Abd: soft/ Non tender, non distended, BS physiological,   Ext: no clubbing, no cyanosis, 1+ edema to level of upper thighs, brisk 2+ DP pulses  Neuro/Psych: pleasant mood and affect  Skin: warm     Pertinent imaging studies:    Per EMR     No results for input(s): WBC, HGB, HCT, PLT, HGBEXT, HCTEXT, PLTEXT in the last 72 hours. Recent Labs     03/28/22  0300 03/27/22  0254 03/26/22  1230    133* 136   K 4.8 5.1 5.0    101 104   CO2 26 25 22   BUN 56* 53* 46*   CREA 1.91* 2.25* 1.89*   GLU 93 96 110*   CA 8.4* 8.6 8.6     No results for input(s): AP, TBIL, TP, ALB, GLOB, GGT, AML, LPSE in the last 72 hours. No lab exists for component: SGOT, GPT, AMYP, HLPSE  No results for input(s): INR, PTP, APTT, INREXT in the last 72 hours. No results for input(s): FE, TIBC, PSAT, FERR in the last 72 hours. No results for input(s): PH, PCO2, PO2 in the last 72 hours. No results for input(s): CPK, CKMB in the last 72 hours.     No lab exists for component: TROPONINI  No components found for: [place]    Chronic Diagnoses:    Problem List as of 3/29/2022 Date Reviewed: 3/24/2022          Codes Class Noted - Resolved    Hypoxemia ICD-10-CM: R09.02  ICD-9-CM: 799.02  3/24/2022 - Present        A-fib ([address] ICD-10-CM: I48.91  ICD-9-CM: 427.31  3/24/2022 - Present        Acquired deafness ICD-10-CM: H91.90  ICD-9-CM: 389.9  12/19/2011 - Present        Bursitis, trochanteric ICD-10-CM: M70.60  ICD-9-CM: 726.5  12/19/2011 - Present              Time spent on discharge related activities today greater than 30 minutes.       Signed:  Eden Flores MD                 Hospitalist, Internal Medicine      Cc: Radha Salmeron MD

## 2022-03-29 NOTE — DISCHARGE INSTRUCTIONS
Please bring this form with you to show your primary care provider at your follow-up appointment. Primary care provider:  Dr. Marysol Morales MD    Discharging provider:  Ольга Domingo MD    You have been admitted to the hospital with the following diagnoses:  · A-fib ([address] [I48.91]  · Hypoxemia [R09.02]    FOLLOW-UP CARE RECOMMENDATIONS:    Hospice care     FOLLOW-UP TESTS recommended: none     PENDING TEST RESULTS:  At the time of your discharge the following test results are still pending: none    DIET/what to eat:  Comfort feeding    These instructions were explained to me and I had the opportunity to ask questions.

## 2022-03-29 NOTE — PALLIATIVE CARE DISCHARGE
The Palliative Medicine team was consulted as part of your / your loved one's care in the hospital. Our team is a supportive service; we strive to relieve suffering and improve quality of life. You identified the following goal(s) as your main focus for healthcare: We talked about your overall medical condition, and discussed how we best care for you moving forward. It is important for you to be in your regular environment at [address]. We talked about hospice care and what that would look like, coming to the hospital/procedures are burdensome for you. You will discharge with hospice services and focus on comfort. We reviewed advance care planning information, which includes the following: We reviewed / discussed your code status as: DNR     Full Code means perform CPR in the event of cardiac arrest     [facility] means do NOT perform CPR in the event of cardiac arrest     Partial Code means you have specific preferences, please discuss with your health care team     No Order means this issue was not addressed / resolved during your stay    You have a Durable Do Not Resuscitate Order in place, which should travel with you. When you are in a facility, this form should be placed on your chart. Once you are home, it is recommended that the [facility] form be placed in a visible location such as on the refrigerator or bedroom door. Because of the importance of this information, we are providing you with a printed copy to share with other healthcare providers after this hospitalization is complete. If any of the above information is incomplete or incorrect, please contact the Palliative Medicine team at [phone].

## 2022-03-29 NOTE — PROGRESS NOTES
Renal Progress Note    NAME:  J Carlos Cleary   :   1923   MRN:   [id number]     Date/Time:  3/29/2022       Assessment:   1. Acute Kidney Injury --> likely sec to Obstruction  2. Stage 3 CKD--> Seems to be approaching baseline  3. A. Fib  4. Heart Failure  5. Cellulitis       Plan:   1. His creatinine may be approaching his baseline. 2. Dose meds for his gfr.  3. Continue diuresis. 4. Avoid NSAIDs + IV contrast  5. Follow lytes. Subjective:         F/U - AKASH, CKD --> [place] --> 3/28/22    No major complaints were offered. F/U - AKASH, CKD --> [facility] --> 3/29/22    Inquired about having Coke.       Review of Systems:  Y  N       Y  N  []         []          Fever/chills                                               []         []          Chest Pain  []         []          Cough                                                       []         []          Diarrhea   []         []          Sputum                                                     []         []          Constipation  []         []          SOB/MEYER                                                []         []          Nausea/Vomit  []         []          Abd Pain                                                    []         []          Tolerating PT  []         []          Dysuria                                                      []         []          Tolerating Diet     []        Unable to obtain  ROS due to  []        mental status change  []        sedated   []        intubated    Medications reviewed:  Current Facility-Administered Medications   Medication Dose Route Frequency    triamcinolone acetonide (KENALOG) 0.1 % ointment   Topical BID    haloperidol (HALDOL) 2 mg/mL oral solution 2 mg  2 mg Oral Q6H PRN    oxyCODONE IR (ROXICODONE) tablet 5 mg  5 mg Oral Q4H PRN    latanoprost (XALATAN) 0.005 % ophthalmic solution 1 Drop  1 Drop Both Eyes QPM    guaiFENesin-dextromethorphan (ROBITUSSIN DM) 100-10 mg/5 mL syrup 5 mL  5 mL Oral Q6H PRN    albuterol-ipratropium (DUO-NEB) 2.5 MG-0.5 MG/3 ML  3 mL Nebulization Q6H RT    midodrine (PROAMATINE) tablet 10 mg  10 mg Oral TID WITH MEALS    dilTIAZem IR (CARDIZEM) tablet 15 mg  15 mg Oral TIDAC    metoprolol tartrate (LOPRESSOR) tablet 12.5 mg  12.5 mg Oral Q12H    sodium chloride (NS) flush 5-40 mL  5-40 mL IntraVENous Q8H    sodium chloride (NS) flush 5-40 mL  5-40 mL IntraVENous PRN    acetaminophen (TYLENOL) tablet 650 mg  650 mg Oral Q6H PRN    Or    acetaminophen (TYLENOL) suppository 650 mg  650 mg Rectal Q6H PRN    polyethylene glycol (MIRALAX) packet 17 g  17 g Oral DAILY PRN    ondansetron (ZOFRAN ODT) tablet 4 mg  4 mg Oral Q8H PRN    Or    ondansetron (ZOFRAN) injection 4 mg  4 mg IntraVENous Q6H PRN    furosemide (LASIX) injection 40 mg  40 mg IntraVENous BID        Objective:   Vitals:  Visit Vitals  /69   Pulse (!) 120   Temp 97.7 °F (36.5 °C)   Resp 19   Ht 5' 9\" (1.753 m)   Wt 96.1 kg (211 lb 14.4 oz)   SpO2 100%   BMI 31.29 kg/m²     Temp (24hrs), Av.2 °F (36.8 °C), Min:97.6 °F (36.4 °C), Max:98.6 °F (37 °C)    O2 Flow Rate (L/min): 3 l/min O2 Device: Nasal cannula    Last 24hr Input/Output:    Intake/Output Summary (Last 24 hours) at 3/29/2022 1045  Last data filed at 3/29/2022 5150  Gross per 24 hour   Intake 240 ml   Output 1875 ml   Net -1635 ml        PHYSICAL EXAM:    Seen in Room 455. General:    Chronically ill-appearing patient. Breakfast was served       Head:   Normocephalic    Eyes:   No icterus    Lungs:   Expiratory wheezes, no rales  . Heart:   No S 3 gallop, No pericardial rub. .  Abdomen:   Not distended     :                 Davis    Extremities: Leg oedema +    Skin:     Chronic Skin changes      Psych:   Not anxious or agitated. Neurologic: Awake and responding to questions.         []        Telemetry Reviewed     []        NSR []        PAC/PVCs   []        Afib  []        Paced   [] NSVT   []        Davis []        NGT  []        Intubated on vent    Lab Data Reviewed:    No results found for this or any previous visit (from the past 24 hour(s)).     Total time spent with patient:  []        15   []        25   []        35   []         __ minutes    []        Critical Care Provided    Care Plan discussed with:    Jatinder Camacho (Daughter)        []        Patient   []        Family    []        Care Manager   []        Consultant/Specialist :      []          >50% of visit spent in counseling and coordination of care   (Discussed []        CODE status,  []        Care Plan, []        D/C Planning)    ___________________________________________________    Attending Physician: Bartolo Blum MD

## 2022-03-29 NOTE — PROGRESS NOTES
Transitions of Care Plan   RUR: 14% - moderate   Clinical Update: stable for hospice discharge   Consults: Cardiology; Palliative  · Baseline: memory care at [facility] Barriers to Discharge: none   Disposition: Daisy Nobles at [address] at [facility] Estimated Discharge Date: 3/29/22  Peggy Reece Does Not Apply for Hospice discharge    CM updated [facility] RN of anticipated [facility]  for discharge at 11AM.    CM requested BLS transport w/ oxygen need for patient for  at 11AM today. CM called [facility] with update on plan for discharge under [facility]. Medicare pt has received, reviewed, and signed 2nd IM letter informing them of their right to appeal the discharge. Signed copy has been placed on pt bedside chart.     Cristina Grewal, MPH  Care Manager l [facility]  Available via [address] [place] [address] or

## 2022-03-29 NOTE — PROGRESS NOTES
0730: Bedside and Verbal shift change report given to Ruth Ann Ventura, RN and Blanka RN (oncoming nurse) by Jayce Trevino RN (offgoing nurse). Report included the following information SBAR, Kardex, MAR, Cardiac rhythm A.fib.